# Patient Record
Sex: MALE | Race: OTHER | Employment: OTHER | URBAN - METROPOLITAN AREA
[De-identification: names, ages, dates, MRNs, and addresses within clinical notes are randomized per-mention and may not be internally consistent; named-entity substitution may affect disease eponyms.]

---

## 2017-01-04 ENCOUNTER — OFFICE VISIT (OUTPATIENT)
Dept: CARDIOLOGY CLINIC | Facility: CLINIC | Age: 67
End: 2017-01-04

## 2017-01-04 VITALS
WEIGHT: 183 LBS | DIASTOLIC BLOOD PRESSURE: 70 MMHG | HEART RATE: 92 BPM | RESPIRATION RATE: 18 BRPM | HEIGHT: 65 IN | BODY MASS INDEX: 30.49 KG/M2 | SYSTOLIC BLOOD PRESSURE: 160 MMHG

## 2017-01-04 DIAGNOSIS — E78.00 HYPERCHOLESTEREMIA: ICD-10-CM

## 2017-01-04 DIAGNOSIS — F17.200 SMOKER: ICD-10-CM

## 2017-01-04 DIAGNOSIS — R01.1 SYSTOLIC MURMUR: Primary | ICD-10-CM

## 2017-01-04 PROCEDURE — G0463 HOSPITAL OUTPT CLINIC VISIT: HCPCS | Performed by: INTERNAL MEDICINE

## 2017-01-04 PROCEDURE — 99204 OFFICE O/P NEW MOD 45 MIN: CPT | Performed by: INTERNAL MEDICINE

## 2017-01-04 NOTE — PATIENT INSTRUCTIONS
Continue current medications. Continue to monitor blood pressure at home. 2D echocardiogram with contrast.  Follow-up with me in 3-4 weeks or sooner if needed.

## 2017-01-04 NOTE — PROGRESS NOTES
Diane Goodman is a 74year old male. HPI:   Patient is here for a new patient appointment. He denies any cardiac history but does have a history of hyperlipidemia, tobacco abuse and significant alcohol abuse.   He also has history of seizure disorde 92  Resp 18  Ht 5' 5\" (1.651 m)  Wt 183 lb (83.008 kg)  BMI 30.45 kg/m2  GENERAL: well developed, well nourished,in no apparent distress  SKIN: no rashes,no suspicious lesions  HEENT: atraumatic, normocephalic,ears and throat are clear  NECK: supple,no ad

## 2017-01-06 ENCOUNTER — HOSPITAL ENCOUNTER (OUTPATIENT)
Dept: CARDIOLOGY CLINIC | Age: 67
End: 2017-01-06
Attending: INTERNAL MEDICINE
Payer: MEDICARE

## 2017-01-06 ENCOUNTER — HOSPITAL ENCOUNTER (OUTPATIENT)
Dept: CV DIAGNOSTICS | Facility: HOSPITAL | Age: 67
Discharge: HOME | End: 2017-01-06
Attending: INTERNAL MEDICINE
Payer: MEDICARE

## 2017-01-06 DIAGNOSIS — R01.1 SYSTOLIC MURMUR: ICD-10-CM

## 2017-01-06 PROCEDURE — 93306 TTE W/DOPPLER COMPLETE: CPT | Performed by: INTERNAL MEDICINE

## 2017-01-06 PROCEDURE — 93306 TTE W/DOPPLER COMPLETE: CPT

## 2017-01-09 ENCOUNTER — TELEPHONE (OUTPATIENT)
Dept: FAMILY MEDICINE CLINIC | Facility: CLINIC | Age: 67
End: 2017-01-09

## 2017-01-09 DIAGNOSIS — N40.0 ENLARGED PROSTATE: Primary | ICD-10-CM

## 2017-01-09 NOTE — TELEPHONE ENCOUNTER
Pt called back, info relayed, referral placed and contact information provided. Will call for appt, denies further questions at this time.

## 2017-01-09 NOTE — TELEPHONE ENCOUNTER
----- Message from Jojo Sams MD sent at 1/8/2017  3:17 PM CST -----  No bladder wall thickening seen on US. Prostate enlarged.  Should see urologist.

## 2017-01-13 ENCOUNTER — TELEPHONE (OUTPATIENT)
Dept: FAMILY MEDICINE CLINIC | Facility: CLINIC | Age: 67
End: 2017-01-13

## 2017-01-13 NOTE — TELEPHONE ENCOUNTER
States out of refills so our office must initiate new prescription for the patient's medications:  Needs both doses of carbamazepine, divalproex, and simvastatin.   Current Outpatient Prescriptions:  CARBAMAZEPINE 200 MG Oral Tab TAKE ONE TABLET BY MOUTH TW

## 2017-01-16 RX ORDER — CARBAMAZEPINE 100 MG/1
100 TABLET, CHEWABLE ORAL DAILY
Qty: 90 TABLET | Refills: 0 | Status: SHIPPED | OUTPATIENT
Start: 2017-01-16 | End: 2017-03-17

## 2017-01-16 RX ORDER — CARBAMAZEPINE 200 MG/1
TABLET ORAL
Qty: 180 TABLET | Refills: 0 | Status: SHIPPED | OUTPATIENT
Start: 2017-01-16 | End: 2017-03-23

## 2017-01-16 RX ORDER — DIVALPROEX SODIUM 500 MG/1
500 TABLET, DELAYED RELEASE ORAL 2 TIMES DAILY
Qty: 180 TABLET | Refills: 0 | Status: SHIPPED | OUTPATIENT
Start: 2017-01-16 | End: 2017-03-24

## 2017-01-16 RX ORDER — SIMVASTATIN 20 MG
20 TABLET ORAL NIGHTLY
Qty: 90 TABLET | Refills: 0 | Status: SHIPPED | OUTPATIENT
Start: 2017-01-16 | End: 2017-03-17

## 2017-01-16 NOTE — TELEPHONE ENCOUNTER
Rx request for Carbamazepine 100mg, Carbamazepine 200mg AND Divalproex Sodium 500mg PASSED Neurology Protocol. Rx filled per protocol.     Refill Protocol Appointment Criteria  · Appointment scheduled in the past 6 months or in the next 3 months  Recent Vis

## 2017-03-17 ENCOUNTER — TELEPHONE (OUTPATIENT)
Dept: INTERNAL MEDICINE CLINIC | Facility: CLINIC | Age: 67
End: 2017-03-17

## 2017-03-22 NOTE — TELEPHONE ENCOUNTER
Pt's wife is calling to follow up on request, states Chevy Senior has been calling pt constantly in regards refill. Please advise.

## 2017-03-23 RX ORDER — CARBAMAZEPINE 100 MG/1
TABLET, CHEWABLE ORAL
Qty: 90 TABLET | Refills: 0 | Status: SHIPPED | OUTPATIENT
Start: 2017-03-23 | End: 2017-05-30

## 2017-03-23 RX ORDER — CARBAMAZEPINE 100 MG/1
TABLET, CHEWABLE ORAL
Qty: 90 TABLET | Refills: 0 | Status: SHIPPED | OUTPATIENT
Start: 2017-03-23 | End: 2017-03-23

## 2017-03-23 RX ORDER — SIMVASTATIN 20 MG
TABLET ORAL
Qty: 90 TABLET | Refills: 0 | Status: SHIPPED | OUTPATIENT
Start: 2017-03-23 | End: 2017-05-30

## 2017-03-23 RX ORDER — CARBAMAZEPINE 200 MG/1
TABLET ORAL
Qty: 180 TABLET | Refills: 0 | Status: SHIPPED | OUTPATIENT
Start: 2017-03-23 | End: 2017-05-30

## 2017-03-23 NOTE — TELEPHONE ENCOUNTER
Refill Protocol Appointment Criteria  · Appointment scheduled in the past 12 months or in the next 3 months    Future Appointments       Provider Department Appt Notes    Tomorrow Geri Chi, [facility]

## 2017-03-23 NOTE — TELEPHONE ENCOUNTER
As per 01/13/17, pt is on two doses of carbemazepine. LOV 06/2016. Last RF 01/2017.   Please advise on refills request.

## 2017-03-24 ENCOUNTER — APPOINTMENT (OUTPATIENT)
Dept: LAB | Facility: HOSPITAL | Age: 67
End: 2017-03-24
Attending: UROLOGY
Payer: MEDICARE

## 2017-03-24 ENCOUNTER — OFFICE VISIT (OUTPATIENT)
Dept: SURGERY | Facility: CLINIC | Age: 67
End: 2017-03-24

## 2017-03-24 VITALS
BODY MASS INDEX: 30.49 KG/M2 | WEIGHT: 183 LBS | DIASTOLIC BLOOD PRESSURE: 82 MMHG | HEART RATE: 98 BPM | SYSTOLIC BLOOD PRESSURE: 157 MMHG | HEIGHT: 65 IN | TEMPERATURE: 98 F | RESPIRATION RATE: 18 BRPM

## 2017-03-24 DIAGNOSIS — N40.0 BENIGN NON-NODULAR PROSTATIC HYPERPLASIA, PRESENCE OF LOWER URINARY TRACT SYMPTOMS UNSPECIFIED: Primary | ICD-10-CM

## 2017-03-24 DIAGNOSIS — N40.0 BENIGN NON-NODULAR PROSTATIC HYPERPLASIA, PRESENCE OF LOWER URINARY TRACT SYMPTOMS UNSPECIFIED: ICD-10-CM

## 2017-03-24 LAB
BACTERIA UR QL AUTO: NEGATIVE /HPF
BILIRUB UR QL: NEGATIVE
CLARITY UR: CLEAR
COLOR UR: YELLOW
GLUCOSE UR-MCNC: NEGATIVE MG/DL
HGB UR QL STRIP.AUTO: NEGATIVE
LEUKOCYTE ESTERASE UR QL STRIP.AUTO: NEGATIVE
NITRITE UR QL STRIP.AUTO: NEGATIVE
PH UR: 6 [PH] (ref 5–8)
PROT UR-MCNC: NEGATIVE MG/DL
PSA SERPL-MCNC: 2.1 NG/ML (ref 0–4)
RBC #/AREA URNS AUTO: 4 /HPF
SP GR UR STRIP: 1.03 (ref 1–1.03)
UROBILINOGEN UR STRIP-ACNC: <2
VIT C UR-MCNC: 20 MG/DL
WBC #/AREA URNS AUTO: 0 /HPF

## 2017-03-24 PROCEDURE — 84153 ASSAY OF PSA TOTAL: CPT

## 2017-03-24 PROCEDURE — 99204 OFFICE O/P NEW MOD 45 MIN: CPT | Performed by: UROLOGY

## 2017-03-24 PROCEDURE — G0463 HOSPITAL OUTPT CLINIC VISIT: HCPCS | Performed by: UROLOGY

## 2017-03-24 PROCEDURE — 81003 URINALYSIS AUTO W/O SCOPE: CPT

## 2017-03-24 PROCEDURE — 36415 COLL VENOUS BLD VENIPUNCTURE: CPT

## 2017-03-24 NOTE — PROGRESS NOTES
SUBJECTIVE:  Jannie Caldwell is a 74year old male who presents for a consultation at the request of, and a copy of this note will be sent to, bladder mass noted on renal and bladder US done 12/2016, for evaluation of  ?  Renal mass seen on a CT without apparent distress. Alert and oriented times three, pleasant and cooperative.   CARDIOVASCULAR:normal apical impulse  RESPIRATORY: no chest wall deformities or tenderness  ABDOMEN: abdomen is soft without significant tenderness, masses, organomegaly or guar

## 2017-03-28 RX ORDER — DIVALPROEX SODIUM 500 MG/1
TABLET, DELAYED RELEASE ORAL
Qty: 180 TABLET | Refills: 0 | Status: SHIPPED | OUTPATIENT
Start: 2017-03-28 | End: 2017-05-30

## 2017-03-28 NOTE — TELEPHONE ENCOUNTER
please advise as does not meet RN protocol for refill criteria    Refill Protocol Appointment Criteria  · Appointment scheduled in the past 6 months or in the next 3 months    Future Appointments       Provider Department Appt Notes    In 1 week LALITHA COSBY

## 2017-04-10 ENCOUNTER — HOSPITAL ENCOUNTER (OUTPATIENT)
Dept: ULTRASOUND IMAGING | Age: 67
Discharge: HOME | End: 2017-04-10
Attending: UROLOGY
Payer: MEDICARE

## 2017-04-10 DIAGNOSIS — N40.0 BENIGN NON-NODULAR PROSTATIC HYPERPLASIA, PRESENCE OF LOWER URINARY TRACT SYMPTOMS UNSPECIFIED: ICD-10-CM

## 2017-04-10 PROCEDURE — 76857 US EXAM PELVIC LIMITED: CPT

## 2017-04-11 ENCOUNTER — TELEPHONE (OUTPATIENT)
Dept: SURGERY | Facility: CLINIC | Age: 67
End: 2017-04-11

## 2017-04-11 DIAGNOSIS — R31.29 MICROHEMATURIA: Primary | ICD-10-CM

## 2017-04-11 NOTE — TELEPHONE ENCOUNTER
I spoke with pt and he gave me verbal permission to speak with his spouse Mahamed Domínguez and I informed her of all the results in msg below and I gave her an appt for the Cysto on Friday, 5/12 at 10am and told her that the IVP needs to be schd.  And I gave the c

## 2017-04-11 NOTE — TELEPHONE ENCOUNTER
Spoke with pt's spouse again and informed her that I will cxl the 4/19 appt that pt had to discuss results and I also told her that I will make the referral for the office cysto that we schd.

## 2017-04-11 NOTE — TELEPHONE ENCOUNTER
Patients wife calling in regards to appt on 04/19 states appt was made for results. Wants to know if patient should still keep appt. Please advise.  Thank you

## 2017-04-11 NOTE — TELEPHONE ENCOUNTER
----- Message from Melody Kovacs MD sent at 4/3/2017 12:25 PM CDT -----  Staff please call this patient. Inform him his PSA level is normal.  His urinalysis however shows abnormally elevated blood cell levels in his urine.   He should be scheduled for an o

## 2017-05-22 PROBLEM — I70.0 ATHEROSCLEROSIS OF AORTA (HCC): Status: ACTIVE | Noted: 2017-05-22

## 2017-05-26 RX ORDER — CARBAMAZEPINE 100 MG/1
TABLET, CHEWABLE ORAL
Qty: 90 TABLET | Refills: 0 | Status: CANCELLED | OUTPATIENT
Start: 2017-05-26

## 2017-05-26 RX ORDER — SIMVASTATIN 20 MG
TABLET ORAL
Qty: 90 TABLET | Refills: 0 | Status: CANCELLED | OUTPATIENT
Start: 2017-05-26

## 2017-05-30 PROBLEM — N40.0 BENIGN NON-NODULAR PROSTATIC HYPERPLASIA WITHOUT LOWER URINARY TRACT SYMPTOMS: Status: ACTIVE | Noted: 2017-05-30

## 2017-05-30 PROBLEM — I10 ESSENTIAL HYPERTENSION: Status: ACTIVE | Noted: 2017-05-30

## 2017-05-30 NOTE — PATIENT INSTRUCTIONS
[place] SCREENING SCHEDULE   Tests on this list are recommended by your physician but may not be covered, or covered at this frequency, by your insurer. Please check with your insurance carrier before scheduling to verify coverage.     Karely Crocker often if abnormal Colonoscopy,10 Years due on 04/01/2017 Update Health Maintenance if applicable    Flex Sigmoidoscopy Screen  Covered every 5 years No results found for this or any previous visit. No flowsheet data found.      Fecal Occult Blood   Covered covered with your prescription benefits, but Medicare does not cover unless Medically needed    Zoster (Not covered by Medicare Part B) No orders found for this or any previous visit.  This may be covered with your pharmacy  prescription benefits     Recomm

## 2017-05-30 NOTE — PROGRESS NOTES
HPI:   Waylon Kwok is a 69year old male who presents for a Medicare Annual Wellness visit.     Patient Active Problem List:     Seizure disorder ([facility] [address]     Overweight     Hypercholesteremia     Elevated blood pressure     Smoker     Cardiomegaly difficulty walking or getting up?: 0-No    Do you have any tripping hazards?: 0-No    Are you on multiple medications?: 1-Yes          Have you had any memory issues?: 0-No    Fall/Risk Scorin    Scoring Interpretation: 0 - 3 No Risk     Depression Scr Family History   Problem Relation Age of Onset   • Cancer Mother      stomach   • Diabetes Mother    • Hypertension Mother    • Cancer Sister 44     skin   • Cancer Brother      thyroid   • Heart Disorder Neg       SOCIAL HISTORY:     Smoking Status: Cur Eye Chart Acuity: 20/40   NECK: supple, no adenopathy, no bruits  CHEST: no chest tenderness  BREAST: no masses or discharge  LUNGS: clear to auscultation  CARDIO: RRR without murmur  GI: good BS's, no masses, HSM or tenderness  : saw urology recently  R EKG One Time done    Colorectal Cancer Screening      Colonoscopy Screen every 10 years Colonoscopy,10 Years due on 04/01/2017 Update Health Maintenance if applicable    Flex Sigmoidoscopy Screen every 10 years No results found for this or any previous flowsheet data found. COPD      Spirometry Testing Annually No results found for this or any previous visit. No flowsheet data found.         SUGGESTED VACCINATIONS - Influenza, Pneumococcal, Zoster, Tetanus     Immunization History  Administered

## 2017-06-04 RX ORDER — DIVALPROEX SODIUM 500 MG/1
TABLET, DELAYED RELEASE ORAL
Qty: 180 TABLET | Refills: 0 | Status: SHIPPED | OUTPATIENT
Start: 2017-06-04 | End: 2018-08-22

## 2017-06-04 NOTE — TELEPHONE ENCOUNTER
Refilled per protocol.   Refill Protocol Appointment Criteria  · Appointment scheduled in the past 6 months or in the next 3 months  Recent Visits       Provider Department Primary Dx    5 days ago Gloria Nix, [address] [place] [address], [place] [address], [address] [facility] [address]

## 2017-06-05 ENCOUNTER — HOSPITAL ENCOUNTER (OUTPATIENT)
Dept: GENERAL RADIOLOGY | Facility: HOSPITAL | Age: 67
Discharge: HOME | End: 2017-06-05
Attending: UROLOGY
Payer: MEDICARE

## 2017-06-05 DIAGNOSIS — R31.29 MICROHEMATURIA: ICD-10-CM

## 2017-06-05 LAB — CREAT BLD-MCNC: 0.6 MG/DL (ref 0.5–1.5)

## 2017-06-05 PROCEDURE — 82565 ASSAY OF CREATININE: CPT

## 2017-06-05 PROCEDURE — 74400 UROGRAPHY IV +-KUB TOMOG: CPT

## 2017-06-16 ENCOUNTER — TELEPHONE (OUTPATIENT)
Dept: SURGERY | Facility: CLINIC | Age: 67
End: 2017-06-16

## 2017-06-19 NOTE — TELEPHONE ENCOUNTER
IVP shows no worrisome findings aside from mild prostate enlargement. He should followup as planned.   Thanks

## 2017-06-27 NOTE — TELEPHONE ENCOUNTER
Phoned pt's wife back and spoke with her. She confirms that she did not receive the result yet. Read to her 's reply as outlined below in this encounter, in it's entirety. She verbalized understanding , agrees to plan, and is thankful.  She is not ask

## 2017-06-28 ENCOUNTER — TELEPHONE (OUTPATIENT)
Dept: SURGERY | Facility: CLINIC | Age: 67
End: 2017-06-28

## 2017-06-28 DIAGNOSIS — N13.8 BPH WITH OBSTRUCTION/LOWER URINARY TRACT SYMPTOMS: Primary | ICD-10-CM

## 2017-06-28 DIAGNOSIS — N40.1 BPH WITH OBSTRUCTION/LOWER URINARY TRACT SYMPTOMS: Primary | ICD-10-CM

## 2017-07-31 ENCOUNTER — TELEPHONE (OUTPATIENT)
Dept: INTERNAL MEDICINE CLINIC | Facility: CLINIC | Age: 67
End: 2017-07-31

## 2017-07-31 ENCOUNTER — OFFICE VISIT (OUTPATIENT)
Dept: FAMILY MEDICINE CLINIC | Facility: CLINIC | Age: 67
End: 2017-07-31

## 2017-07-31 ENCOUNTER — TELEPHONE (OUTPATIENT)
Dept: FAMILY MEDICINE CLINIC | Facility: CLINIC | Age: 67
End: 2017-07-31

## 2017-07-31 VITALS
BODY MASS INDEX: 30 KG/M2 | HEART RATE: 88 BPM | DIASTOLIC BLOOD PRESSURE: 78 MMHG | SYSTOLIC BLOOD PRESSURE: 128 MMHG | TEMPERATURE: 99 F | WEIGHT: 182 LBS

## 2017-07-31 DIAGNOSIS — B95.8 STAPH SKIN INFECTION: Primary | ICD-10-CM

## 2017-07-31 DIAGNOSIS — L08.9 STAPH SKIN INFECTION: Primary | ICD-10-CM

## 2017-07-31 PROCEDURE — 99213 OFFICE O/P EST LOW 20 MIN: CPT | Performed by: FAMILY MEDICINE

## 2017-07-31 PROCEDURE — G0463 HOSPITAL OUTPT CLINIC VISIT: HCPCS | Performed by: FAMILY MEDICINE

## 2017-07-31 RX ORDER — MUPIROCIN CALCIUM 20 MG/G
1 CREAM TOPICAL 2 TIMES DAILY
Qty: 30 G | Refills: 1 | Status: SHIPPED | OUTPATIENT
Start: 2017-07-31 | End: 2017-08-10

## 2017-07-31 RX ORDER — CLARITHROMYCIN 500 MG/1
500 TABLET, COATED ORAL 2 TIMES DAILY
Qty: 20 TABLET | Refills: 0 | Status: SHIPPED | OUTPATIENT
Start: 2017-07-31 | End: 2017-08-10

## 2017-07-31 RX ORDER — MOMETASONE FUROATE 1 MG/G
CREAM TOPICAL
Qty: 30 G | Refills: 1 | Status: SHIPPED | OUTPATIENT
Start: 2017-07-31 | End: 2017-08-10

## 2017-07-31 RX ORDER — CLARITHROMYCIN 500 MG/1
500 TABLET, COATED ORAL 2 TIMES DAILY
Qty: 20 TABLET | Refills: 0 | Status: SHIPPED | OUTPATIENT
Start: 2017-07-31 | End: 2017-07-31

## 2017-07-31 NOTE — TELEPHONE ENCOUNTER
Actions Requested: appt given with Dr. Jax Montenegro. Problem: rash on right wrist  Onset and Timing: one week ago  Associated Symptoms: began as flat rash around right wrist.  Was itchy but now hand is swollen and rash is spreading into his hand.   Pt has trie

## 2017-07-31 NOTE — PROGRESS NOTES
7/31/2017  3:26 PM    Sarahy Tolbert is a 69year old male. Chief complaint(s): Patient presents with:  Rash: pt c/o rash on right wrist X 1 week    HPI:     Sarahy Tolbert primary complaint is regarding rash.      Sarahy Tolbert is a 69 y Application topically 2 (two) times daily.  Disp: 30 g Rfl: 1   Mometasone Furoate 0.1 % External Cream Apply to affected area(s) BID Disp: 30 g Rfl: 1   DIVALPROEX SODIUM 500 MG Oral Tab EC DR tab TAKE 1 TABLET TWICE DAILY Disp: 180 tablet Rfl: 0   simvast icterus. Neck: Neck supple. Cardiovascular: Normal rate and regular rhythm. Pulmonary/Chest:   Clear to ascultation bilaterally. Skin: No rash noted. Right wrist erythematous square    Psychiatric:   Appropriate affect and demeanor.        [facility] Referrals:  None         Lucas Fuller MD

## 2017-07-31 NOTE — TELEPHONE ENCOUNTER
Christophe's calling to advise Dr. John Duran that the medications below are interacting. Melissa Elizabeth states Dr. John Duran can switch the clarithromycin or stop the simvastatin while on the clarithromycin.       Current Outpatient Prescriptions:   •  clarithromycin (CARLOS

## 2017-08-01 NOTE — TELEPHONE ENCOUNTER
Please advise. Pharmacy notified of Dr Milo Aguirre message below. Pharmacy states that insurance will not allow override without letter from doctor saying patient will hold simvastatin.   Pharmacy did not have fax number to send this letter to, just provide

## 2017-08-01 NOTE — TELEPHONE ENCOUNTER
Please advise on the messages below. Medication interaction identified by pharmacy for biaxin and simvastatin. Do you want to change antibiotics or have pt stop simvastatin while on biaxin?

## 2017-08-03 ENCOUNTER — OFFICE VISIT (OUTPATIENT)
Dept: FAMILY MEDICINE CLINIC | Facility: CLINIC | Age: 67
End: 2017-08-03

## 2017-08-03 VITALS
TEMPERATURE: 98 F | SYSTOLIC BLOOD PRESSURE: 166 MMHG | WEIGHT: 183 LBS | HEART RATE: 92 BPM | DIASTOLIC BLOOD PRESSURE: 88 MMHG | BODY MASS INDEX: 30 KG/M2

## 2017-08-03 DIAGNOSIS — F17.200 NICOTINE USE DISORDER: ICD-10-CM

## 2017-08-03 DIAGNOSIS — B95.8 STAPH SKIN INFECTION: Primary | ICD-10-CM

## 2017-08-03 DIAGNOSIS — L08.9 STAPH SKIN INFECTION: Primary | ICD-10-CM

## 2017-08-03 PROCEDURE — G0463 HOSPITAL OUTPT CLINIC VISIT: HCPCS | Performed by: FAMILY MEDICINE

## 2017-08-03 PROCEDURE — 99214 OFFICE O/P EST MOD 30 MIN: CPT | Performed by: FAMILY MEDICINE

## 2017-08-03 NOTE — PROGRESS NOTES
8/3/2017  2:23 PM    Alexander Farrell is a 69year old male. Chief complaint(s): Patient presents with: Follow - Up  Rash    HPI:     Alexander Farrell primary complaint is regarding rash.      Alexander Farrell is a 69year old male who presents topically 2 (two) times daily. Disp: 30 g Rfl: 1   Mometasone Furoate 0.1 % External Cream Apply to affected area(s) BID Disp: 30 g Rfl: 1   clarithromycin (BIAXIN) 500 MG Oral Tab Take 1 tablet (500 mg total) by mouth 2 (two) times daily.  Disp: 20 tablet Conjunctivae are normal. No scleral icterus. Neck: Neck supple. Cardiovascular: Normal rate and regular rhythm. Pulmonary/Chest:   Clear to ascultation bilaterally. Skin: No rash noted.    Right wrist erythema minimal    Psychiatric:   Appropriate call our office with any questions or concerns. Notify Dr Korey Hills or the [facility] if there is a deterioration or worsening of the medical condition. Also, inform the doctor with any new symptoms or medications' side effects.   CPM    FOLLOW-UP: Sche

## 2017-08-10 ENCOUNTER — OFFICE VISIT (OUTPATIENT)
Dept: SURGERY | Facility: CLINIC | Age: 67
End: 2017-08-10

## 2017-08-10 VITALS
HEIGHT: 65 IN | SYSTOLIC BLOOD PRESSURE: 140 MMHG | WEIGHT: 180 LBS | TEMPERATURE: 98 F | RESPIRATION RATE: 18 BRPM | BODY MASS INDEX: 29.99 KG/M2 | DIASTOLIC BLOOD PRESSURE: 82 MMHG | HEART RATE: 90 BPM

## 2017-08-10 DIAGNOSIS — N40.1 BPH WITH OBSTRUCTION/LOWER URINARY TRACT SYMPTOMS: ICD-10-CM

## 2017-08-10 DIAGNOSIS — N13.8 BPH WITH OBSTRUCTION/LOWER URINARY TRACT SYMPTOMS: ICD-10-CM

## 2017-08-10 DIAGNOSIS — R31.29 MICROHEMATURIA: Primary | ICD-10-CM

## 2017-08-10 PROCEDURE — 99213 OFFICE O/P EST LOW 20 MIN: CPT | Performed by: UROLOGY

## 2017-08-10 PROCEDURE — 52000 CYSTOURETHROSCOPY: CPT | Performed by: UROLOGY

## 2017-08-10 NOTE — PROGRESS NOTES
Jannie Caldwell is a 69year old male. HPI:   Patient presents with:  Hematuria: cystoscopy  18-year-old male presents for office cystoscopy  Cystoscopy.   He was seen March 24, 2017 in consultation because of an incidental bladder mass identified on DAY Disp: 180 tablet Rfl: 3   Calcium Carb-Cholecalciferol (OYSTER SHELL CALCIUM/VITAMIN D) 250-125 MG-UNIT Oral Tab Take 1 tablet by mouth daily. Disp:  Rfl:    aspirin 81 MG Oral Tab EC Take 1 tablet (81 mg total) by mouth daily.  Disp: 90 tablet Rfl: 1

## 2017-10-03 ENCOUNTER — OFFICE VISIT (OUTPATIENT)
Dept: GASTROENTEROLOGY | Facility: CLINIC | Age: 67
End: 2017-10-03

## 2017-10-03 ENCOUNTER — TELEPHONE (OUTPATIENT)
Dept: GASTROENTEROLOGY | Facility: CLINIC | Age: 67
End: 2017-10-03

## 2017-10-03 VITALS
DIASTOLIC BLOOD PRESSURE: 68 MMHG | HEART RATE: 81 BPM | BODY MASS INDEX: 29.82 KG/M2 | SYSTOLIC BLOOD PRESSURE: 122 MMHG | WEIGHT: 179 LBS | HEIGHT: 65 IN

## 2017-10-03 DIAGNOSIS — Z12.11 ENCOUNTER FOR COLORECTAL CANCER SCREENING: Primary | ICD-10-CM

## 2017-10-03 DIAGNOSIS — Z72.0 TOBACCO USE: ICD-10-CM

## 2017-10-03 DIAGNOSIS — Z12.11 COLON CANCER SCREENING: Primary | ICD-10-CM

## 2017-10-03 DIAGNOSIS — Z12.12 ENCOUNTER FOR COLORECTAL CANCER SCREENING: Primary | ICD-10-CM

## 2017-10-03 DIAGNOSIS — I10 ESSENTIAL HYPERTENSION: ICD-10-CM

## 2017-10-03 PROCEDURE — 99202 OFFICE O/P NEW SF 15 MIN: CPT | Performed by: INTERNAL MEDICINE

## 2017-10-03 PROCEDURE — G0463 HOSPITAL OUTPT CLINIC VISIT: HCPCS | Performed by: INTERNAL MEDICINE

## 2017-10-03 RX ORDER — BETA-CAROTENE(A)-C,E/SELENIUM
CAPSULE ORAL
COMMUNITY
Start: 2017-07-10 | End: 2020-08-04

## 2017-10-03 RX ORDER — CLOTRIMAZOLE 1 %
CREAM (GRAM) TOPICAL
COMMUNITY
Start: 2017-07-10 | End: 2018-03-13

## 2017-10-03 NOTE — PROGRESS NOTES
HPI:    Patient ID: Ovi Cortes is a 69year old male. HPI    Review of Systems   Constitutional: Negative for appetite change, chills, diaphoresis, fatigue, fever and unexpected weight change.    HENT: Negative for congestion, ear pain and noseb well-developed and well-nourished. No distress. HENT:   Head: Normocephalic and atraumatic. Mouth/Throat: Oropharynx is clear and moist. No oropharyngeal exudate. Eyes: Conjunctivae and EOM are normal. Pupils are equal, round, and reactive to light.

## 2017-10-03 NOTE — PATIENT INSTRUCTIONS
1.  Schedule colonoscopy with split dose MiraLAX preparation and MAC    2. Quit smoking on the day of the procedure.     3.  Take your blood pressure at home twice daily and show findings to your primary care provider

## 2017-10-03 NOTE — H&P
History of present illness: This is a 66-year-old male patient of Dr. Jackelin Harden who is referred for colorectal cancer screening evaluation.   The patient is here with his wife who is translating at the patient request.  The patient has undergone colonoscopy indicated.

## 2017-10-03 NOTE — TELEPHONE ENCOUNTER
Scheduled for:  Colonoscopy 07439  Provider Name: Dr Gaston Godinez  Date:  Leo Lew 11/30/17  Location:  [facility]  Sedation:  MAC  Time:  8:30 am  Prep: split dose miralax  Meds/Allergies Reconciled?:  NKDA  Diagnosis with codes:  Screening Z12.11  Was patient informed to c

## 2017-10-04 NOTE — TELEPHONE ENCOUNTER
I called and left a voicemail message for pt to return my call regarding moving his scheduled colonoscopy procedure from 8:30 am to 10:30 am or 11:00 am @ [address] [place] [address] on Thurs 11/30/17, same location and date just different time due to Alessandro 27 unavailability.     Shakira Reach

## 2017-10-05 NOTE — TELEPHONE ENCOUNTER
I Spoke to Mrs Terry Johnson and she stated she had already spoken to someone in our office this morning. As per our conversation, pt's wife agreed to a 10:30 colonoscopy procedure with an arrival time of t 9:30 am @ [facility].   I sent a message to Carolynn and made the

## 2017-11-30 ENCOUNTER — ANESTHESIA (OUTPATIENT)
Dept: ENDOSCOPY | Facility: HOSPITAL | Age: 67
End: 2017-11-30
Payer: MEDICARE

## 2017-11-30 ENCOUNTER — SURGERY (OUTPATIENT)
Age: 67
End: 2017-11-30

## 2017-11-30 ENCOUNTER — HOSPITAL ENCOUNTER (OUTPATIENT)
Facility: HOSPITAL | Age: 67
Setting detail: HOSPITAL OUTPATIENT SURGERY
Discharge: HOME | End: 2017-11-30
Attending: INTERNAL MEDICINE | Admitting: INTERNAL MEDICINE
Payer: MEDICARE

## 2017-11-30 ENCOUNTER — ANESTHESIA EVENT (OUTPATIENT)
Dept: ENDOSCOPY | Facility: HOSPITAL | Age: 67
End: 2017-11-30
Payer: MEDICARE

## 2017-11-30 VITALS
WEIGHT: 172 LBS | OXYGEN SATURATION: 98 % | DIASTOLIC BLOOD PRESSURE: 76 MMHG | SYSTOLIC BLOOD PRESSURE: 133 MMHG | HEIGHT: 62 IN | RESPIRATION RATE: 22 BRPM | HEART RATE: 73 BPM | BODY MASS INDEX: 31.65 KG/M2

## 2017-11-30 DIAGNOSIS — K63.5 POLYP OF TRANSVERSE COLON, UNSPECIFIED TYPE: ICD-10-CM

## 2017-11-30 DIAGNOSIS — Z12.11 COLON CANCER SCREENING: ICD-10-CM

## 2017-11-30 DIAGNOSIS — K63.5 POLYP OF ASCENDING COLON, UNSPECIFIED TYPE: Primary | ICD-10-CM

## 2017-11-30 DIAGNOSIS — K64.9 HEMORRHOIDS: ICD-10-CM

## 2017-11-30 PROBLEM — K64.8 INTERNAL HEMORRHOIDS: Status: ACTIVE | Noted: 2017-11-30

## 2017-11-30 PROBLEM — Z98.890 S/P COLONOSCOPIC POLYPECTOMY: Status: ACTIVE | Noted: 2017-11-30

## 2017-11-30 PROCEDURE — 0DBL8ZX EXCISION OF TRANSVERSE COLON, VIA NATURAL OR ARTIFICIAL OPENING ENDOSCOPIC, DIAGNOSTIC: ICD-10-PCS | Performed by: INTERNAL MEDICINE

## 2017-11-30 PROCEDURE — 45385 COLONOSCOPY W/LESION REMOVAL: CPT | Performed by: INTERNAL MEDICINE

## 2017-11-30 PROCEDURE — 0DBK8ZX EXCISION OF ASCENDING COLON, VIA NATURAL OR ARTIFICIAL OPENING ENDOSCOPIC, DIAGNOSTIC: ICD-10-PCS | Performed by: INTERNAL MEDICINE

## 2017-11-30 RX ORDER — SODIUM CHLORIDE, SODIUM LACTATE, POTASSIUM CHLORIDE, CALCIUM CHLORIDE 600; 310; 30; 20 MG/100ML; MG/100ML; MG/100ML; MG/100ML
INJECTION, SOLUTION INTRAVENOUS CONTINUOUS
Status: DISCONTINUED | OUTPATIENT
Start: 2017-11-30 | End: 2017-11-30

## 2017-11-30 RX ORDER — LIDOCAINE HYDROCHLORIDE 10 MG/ML
INJECTION, SOLUTION EPIDURAL; INFILTRATION; INTRACAUDAL; PERINEURAL AS NEEDED
Status: DISCONTINUED | OUTPATIENT
Start: 2017-11-30 | End: 2017-11-30 | Stop reason: SURG

## 2017-11-30 RX ORDER — NALOXONE HYDROCHLORIDE 0.4 MG/ML
80 INJECTION, SOLUTION INTRAMUSCULAR; INTRAVENOUS; SUBCUTANEOUS AS NEEDED
Status: DISCONTINUED | OUTPATIENT
Start: 2017-11-30 | End: 2017-11-30

## 2017-11-30 RX ADMIN — SODIUM CHLORIDE, SODIUM LACTATE, POTASSIUM CHLORIDE, CALCIUM CHLORIDE: 600; 310; 30; 20 INJECTION, SOLUTION INTRAVENOUS at 11:24:00

## 2017-11-30 RX ADMIN — LIDOCAINE HYDROCHLORIDE 50 MG: 10 INJECTION, SOLUTION EPIDURAL; INFILTRATION; INTRACAUDAL; PERINEURAL at 11:28:00

## 2017-11-30 NOTE — ANESTHESIA POSTPROCEDURE EVALUATION
Patient: Marco Cruz    Procedure Summary     Date:  11/30/17 Room / Location:  [facility] ENDOSCOPY 05 / [facility] ENDOSCOPY    Anesthesia Start:  6619 Anesthesia Stop:  5094    Procedure:  COLONOSCOPY (N/A ) Diagnosis:       Colon cancer screening      (polyp

## 2017-11-30 NOTE — ANESTHESIA PREPROCEDURE EVALUATION
Anesthesia PreOp Note    HPI:     Alexander Farrell is a 69year old male who presents for preoperative consultation requested by: Rosalio Morales MD    Date of Surgery: 11/30/2017    Procedure(s):  COLONOSCOPY  Indication: Colon cancer scree carBAMazepine 200 MG Oral Tab TAKE ONE TABLET BY MOUTH TWICE A DAY Disp: 180 tablet Rfl: 3 11/29/2017   Calcium Carb-Cholecalciferol (OYSTER SHELL CALCIUM/VITAMIN D) 250-125 MG-UNIT Oral Tab Take 1 tablet by mouth daily.    Disp:  Rfl:  Taking   aspirin 8 2  Plan:   MAC  Post-op Pain Management: IV analgesics  Informed Consent Plan and Risks Discussed With:  Patient  Discussed plan with:  CRNA      I have informed Adri Weems  of the nature of the anesthetic plan, benefits, risks, major complication

## 2017-11-30 NOTE — H&P
History & Physical Examination    Patient Name: Marco Cruz  MRN: [id number]  [facility]: [id number]  YOB: 1950    Diagnosis: colorectal screening      Prescriptions Prior to Admission:  Calcium Citrate-Vitamin D 1000-400 Oral Liquid  Disp: not normal, please explain:   HEBETO [ Marco Meredith  [ Mina Mention [ Osiel Salon [ Eddie [place] [ Bud Guzman [ Geryl [place]      I have discussed the risks and benefits and alternatives of the procedure with the patient/family.   They understand and agree to pro

## 2017-11-30 NOTE — BRIEF OP NOTE
Pre-Operative Diagnosis: Colon cancer screening      Post-Operative Diagnosis: Suboptimal preparation, status post polypectomy ×2, internal hemorrhoids     Procedure Performed:   Procedure(s):  COLONOSCOPY with polypectomy ×2    Surgeon(s) and Role:

## 2017-11-30 NOTE — OPERATIVE REPORT
[facility]    PATIENT'S NAME: Marquisfany Radha   ATTENDING PHYSICIAN: Merline Johns MD   OPERATING PHYSICIAN: Merline Johns MD   PATIENT ACCOUNT#:   [id number]    LOCATION:  [facility] ROOM 2 [address] small lesions cannot be entirely excluded because of suboptimal prep. Retroflexion in the rectum showed small internal hemorrhoids. The patient tolerated the procedure well without immediate complication. IMPRESSION:    1.    Status post polypectomy x2

## 2017-12-02 ENCOUNTER — TELEPHONE (OUTPATIENT)
Dept: GASTROENTEROLOGY | Facility: CLINIC | Age: 67
End: 2017-12-02

## 2017-12-04 NOTE — TELEPHONE ENCOUNTER
Entered into EPIC:Recall colon in 1 year per Dr. Otilia Castro. Last  Colon done 11/30/2017, next due 11/30/2018. Snapshot updated.

## 2018-01-08 ENCOUNTER — TELEPHONE (OUTPATIENT)
Dept: FAMILY MEDICINE CLINIC | Facility: CLINIC | Age: 68
End: 2018-01-08

## 2018-01-08 NOTE — TELEPHONE ENCOUNTER
Pt is eligible for Medicare Annual Health Assessment in May. If patient returns call please  assist in scheduling. Left message to call back [phone].

## 2018-02-02 ENCOUNTER — TELEPHONE (OUTPATIENT)
Dept: FAMILY MEDICINE CLINIC | Facility: CLINIC | Age: 68
End: 2018-02-02

## 2018-02-13 ENCOUNTER — OFFICE VISIT (OUTPATIENT)
Dept: FAMILY MEDICINE CLINIC | Facility: CLINIC | Age: 68
End: 2018-02-13

## 2018-02-13 VITALS
SYSTOLIC BLOOD PRESSURE: 138 MMHG | BODY MASS INDEX: 30.05 KG/M2 | HEIGHT: 65 IN | HEART RATE: 86 BPM | WEIGHT: 180.38 LBS | DIASTOLIC BLOOD PRESSURE: 79 MMHG | TEMPERATURE: 99 F

## 2018-02-13 DIAGNOSIS — Z11.59 NEED FOR HEPATITIS C SCREENING TEST: ICD-10-CM

## 2018-02-13 DIAGNOSIS — J43.9 PULMONARY EMPHYSEMA, UNSPECIFIED EMPHYSEMA TYPE (HCC): ICD-10-CM

## 2018-02-13 DIAGNOSIS — N40.0 BENIGN NON-NODULAR PROSTATIC HYPERPLASIA WITHOUT LOWER URINARY TRACT SYMPTOMS: ICD-10-CM

## 2018-02-13 DIAGNOSIS — Z00.00 ENCOUNTER FOR ANNUAL HEALTH EXAMINATION: ICD-10-CM

## 2018-02-13 DIAGNOSIS — Z00.00 MEDICARE ANNUAL WELLNESS VISIT, SUBSEQUENT: Primary | ICD-10-CM

## 2018-02-13 DIAGNOSIS — I70.0 ATHEROSCLEROSIS OF AORTA (HCC): ICD-10-CM

## 2018-02-13 DIAGNOSIS — K64.8 INTERNAL HEMORRHOIDS: ICD-10-CM

## 2018-02-13 DIAGNOSIS — Z98.890 S/P COLONOSCOPIC POLYPECTOMY: ICD-10-CM

## 2018-02-13 DIAGNOSIS — G40.909 SEIZURE DISORDER (HCC): ICD-10-CM

## 2018-02-13 DIAGNOSIS — F17.200 SMOKER: ICD-10-CM

## 2018-02-13 DIAGNOSIS — D12.6 TUBULAR ADENOMA OF COLON: ICD-10-CM

## 2018-02-13 DIAGNOSIS — R31.29 MICROHEMATURIA: ICD-10-CM

## 2018-02-13 DIAGNOSIS — E78.00 HYPERCHOLESTEREMIA: ICD-10-CM

## 2018-02-13 DIAGNOSIS — H54.7 DECREASED VISUAL ACUITY: ICD-10-CM

## 2018-02-13 DIAGNOSIS — M25.471 RIGHT ANKLE SWELLING: ICD-10-CM

## 2018-02-13 DIAGNOSIS — I10 ESSENTIAL HYPERTENSION: ICD-10-CM

## 2018-02-13 PROBLEM — Z72.0 TOBACCO USE: Status: RESOLVED | Noted: 2017-10-03 | Resolved: 2018-02-13

## 2018-02-13 PROCEDURE — G0439 PPPS, SUBSEQ VISIT: HCPCS | Performed by: FAMILY MEDICINE

## 2018-02-13 PROCEDURE — 96160 PT-FOCUSED HLTH RISK ASSMT: CPT | Performed by: FAMILY MEDICINE

## 2018-02-13 RX ORDER — HYDROCHLOROTHIAZIDE 25 MG/1
25 TABLET ORAL EVERY MORNING
Qty: 90 TABLET | Refills: 0 | Status: SHIPPED | OUTPATIENT
Start: 2018-02-13 | End: 2018-03-13

## 2018-02-13 NOTE — PROGRESS NOTES
HPI:   José Miguel Braun is a 69year old male who presents for a Medicare Subsequent Annual Wellness visit (Pt already had Initial Annual Wellness).       Annual Physical due on 05/30/2018        Fall/Risk Assessment Update needed    Last Fall risk scr Packs/day: 0.25      Years: 40.00        Types: Cigarettes  Smokeless tobacco: Never Used                      Comment: 4-5 cig / daily      This is a tobacco user, and I will give tobacco cessation counseling today.  (update Vitals or Tob Hx sectio DIVALPROEX SODIUM 500 MG Oral Tab EC DR tab TAKE 1 TABLET TWICE DAILY   simvastatin 20 MG Oral Tab TAKE 1 TABLET EVERY NIGHT   carBAMazepine 100 MG Oral Chew Tab CHEW AND SWALLOW 1 TABLET EVERY DAY   carBAMazepine 200 MG Oral Tab TAKE ONE TABLET BY MOUTH conrad:  No I have trouble hearing conversations in a noisy background such as a crowded room or restaurant:  No   I get confused about where sounds come from:  No I misunderstand some words in a sentence and need to ask people to repeat themselves:   No masses, no organomegaly   Genitalia: Normal male   Rectal: Normal tone, normal prostate, no masses or tenderness   Extremities: Extremities normal, atraumatic, no cyanosis or edema   Pulses: 2+ and symmetric   Skin: Skin color, texture, turgor normal, no r PLAN:  The patient indicates understanding of these issues and agrees to the plan. Reinforced healthy diet, lifestyle, and exercise. Patient counseled to quit smoking. Smoking cessation options reviewed.   Risks associated with smoking including canc Hepatitis B for Moderate/High Risk No vaccine history found Medium/high risk factors:   End-stage renal disease   Hemophiliacs who received Factor VIII or IX concentrates   Clients of institutions for the mentally retarded   Persons who live in the same [place]

## 2018-02-13 NOTE — PATIENT INSTRUCTIONS
Kendrick [place] SCREENING SCHEDULE   Tests on this list are recommended by your physician but may not be covered, or covered at this frequency, by your insurer. Please check with your insurance carrier before scheduling to verify coverage.     Avani Sales often if abnormal Colonoscopy,1 Year due on 11/30/2018 Update Health Maintenance if applicable    Flex Sigmoidoscopy Screen  Covered every 5 years No results found for this or any previous visit. No flowsheet data found.      Fecal Occult Blood   Covered An covered with your prescription benefits, but Medicare does not cover unless Medically needed    Zoster (Not covered by Medicare Part B) No orders found for this or any previous visit.  This may be covered with your pharmacy  prescription benefits     Recomm

## 2018-02-27 ENCOUNTER — LAB ENCOUNTER (OUTPATIENT)
Dept: LAB | Age: 68
End: 2018-02-27
Attending: FAMILY MEDICINE
Payer: MEDICARE

## 2018-02-27 DIAGNOSIS — G40.909 SEIZURE DISORDER (HCC): ICD-10-CM

## 2018-02-27 DIAGNOSIS — Z11.59 NEED FOR HEPATITIS C SCREENING TEST: ICD-10-CM

## 2018-02-27 DIAGNOSIS — E78.00 HYPERCHOLESTEREMIA: ICD-10-CM

## 2018-02-27 LAB
ALBUMIN SERPL BCP-MCNC: 3.9 G/DL (ref 3.5–4.8)
ALBUMIN/GLOB SERPL: 1.1 {RATIO} (ref 1–2)
ALP SERPL-CCNC: 65 U/L (ref 32–100)
ALT SERPL-CCNC: 36 U/L (ref 17–63)
ANION GAP SERPL CALC-SCNC: 8 MMOL/L (ref 0–18)
AST SERPL-CCNC: 29 U/L (ref 15–41)
BASOPHILS # BLD: 0.1 K/UL (ref 0–0.2)
BASOPHILS NFR BLD: 1 %
BILIRUB SERPL-MCNC: 0.5 MG/DL (ref 0.3–1.2)
BUN SERPL-MCNC: 13 MG/DL (ref 8–20)
BUN/CREAT SERPL: 20 (ref 10–20)
CALCIUM SERPL-MCNC: 9.2 MG/DL (ref 8.5–10.5)
CHLORIDE SERPL-SCNC: 98 MMOL/L (ref 95–110)
CHOLEST SERPL-MCNC: 201 MG/DL (ref 110–200)
CO2 SERPL-SCNC: 27 MMOL/L (ref 22–32)
CREAT SERPL-MCNC: 0.65 MG/DL (ref 0.5–1.5)
EOSINOPHIL # BLD: 0.4 K/UL (ref 0–0.7)
EOSINOPHIL NFR BLD: 5 %
ERYTHROCYTE [DISTWIDTH] IN BLOOD BY AUTOMATED COUNT: 13.8 % (ref 11–15)
GLOBULIN PLAS-MCNC: 3.6 G/DL (ref 2.5–3.7)
GLUCOSE SERPL-MCNC: 104 MG/DL (ref 70–99)
HCT VFR BLD AUTO: 43 % (ref 41–52)
HDLC SERPL-MCNC: 68 MG/DL
HGB BLD-MCNC: 14.7 G/DL (ref 13.5–17.5)
LDLC SERPL CALC-MCNC: 112 MG/DL (ref 0–99)
LYMPHOCYTES # BLD: 3.1 K/UL (ref 1–4)
LYMPHOCYTES NFR BLD: 40 %
MCH RBC QN AUTO: 32.4 PG (ref 27–32)
MCHC RBC AUTO-ENTMCNC: 34.3 G/DL (ref 32–37)
MCV RBC AUTO: 94.4 FL (ref 80–100)
MONOCYTES # BLD: 0.8 K/UL (ref 0–1)
MONOCYTES NFR BLD: 10 %
NEUTROPHILS # BLD AUTO: 3.5 K/UL (ref 1.8–7.7)
NEUTROPHILS NFR BLD: 44 %
NONHDLC SERPL-MCNC: 133 MG/DL
OSMOLALITY UR CALC.SUM OF ELEC: 276 MOSM/KG (ref 275–295)
PATIENT FASTING: YES
PLATELET # BLD AUTO: 200 K/UL (ref 140–400)
PMV BLD AUTO: 8.4 FL (ref 7.4–10.3)
POTASSIUM SERPL-SCNC: 3.8 MMOL/L (ref 3.3–5.1)
PROT SERPL-MCNC: 7.5 G/DL (ref 5.9–8.4)
RBC # BLD AUTO: 4.55 M/UL (ref 4.5–5.9)
SODIUM SERPL-SCNC: 133 MMOL/L (ref 136–144)
TRIGL SERPL-MCNC: 104 MG/DL (ref 1–149)
WBC # BLD AUTO: 7.8 K/UL (ref 4–11)

## 2018-02-27 PROCEDURE — 85025 COMPLETE CBC W/AUTO DIFF WBC: CPT

## 2018-02-27 PROCEDURE — 86803 HEPATITIS C AB TEST: CPT

## 2018-02-27 PROCEDURE — 80061 LIPID PANEL: CPT

## 2018-02-27 PROCEDURE — 80053 COMPREHEN METABOLIC PANEL: CPT

## 2018-02-27 PROCEDURE — 36415 COLL VENOUS BLD VENIPUNCTURE: CPT

## 2018-02-28 LAB — HCV AB SERPL QL IA: NONREACTIVE

## 2018-03-06 ENCOUNTER — TELEPHONE (OUTPATIENT)
Dept: FAMILY MEDICINE CLINIC | Facility: CLINIC | Age: 68
End: 2018-03-06

## 2018-03-06 NOTE — TELEPHONE ENCOUNTER
----- Message from aCrlyn Farley MD sent at 3/5/2018  8:53 PM CST -----  Labs all good including cholesterol stable. Blood counts liver and kidney function good. Hepatitis C is negative as per required by Medicare.   Please continue current medications and

## 2018-03-13 ENCOUNTER — OFFICE VISIT (OUTPATIENT)
Dept: FAMILY MEDICINE CLINIC | Facility: CLINIC | Age: 68
End: 2018-03-13

## 2018-03-13 VITALS
SYSTOLIC BLOOD PRESSURE: 123 MMHG | HEART RATE: 93 BPM | HEIGHT: 65 IN | BODY MASS INDEX: 30.01 KG/M2 | TEMPERATURE: 99 F | DIASTOLIC BLOOD PRESSURE: 81 MMHG | WEIGHT: 180.13 LBS

## 2018-03-13 DIAGNOSIS — I10 ESSENTIAL HYPERTENSION: Primary | ICD-10-CM

## 2018-03-13 DIAGNOSIS — G40.909 SEIZURE DISORDER (HCC): ICD-10-CM

## 2018-03-13 PROCEDURE — G0463 HOSPITAL OUTPT CLINIC VISIT: HCPCS | Performed by: FAMILY MEDICINE

## 2018-03-13 PROCEDURE — 99213 OFFICE O/P EST LOW 20 MIN: CPT | Performed by: FAMILY MEDICINE

## 2018-03-13 RX ORDER — HYDROCHLOROTHIAZIDE 25 MG/1
25 TABLET ORAL EVERY MORNING
Qty: 90 TABLET | Refills: 3 | Status: SHIPPED | OUTPATIENT
Start: 2018-03-13 | End: 2019-02-14

## 2018-03-13 NOTE — PROGRESS NOTES
Shwetha Philip is a 69year old male. HPI:   Patient presents for recheck of his hypertension. Pt has been taking medications as instructed, no medication side effects.     Wt Readings from Last 6 Encounters:  03/13/18 : 180 lb 2 oz (81.7 kg)  02/1 Diagnosis Date   • Hyperlipidemia    • Internal hemorrhoids 11/30/2017   • Pulmonary emphysema ([facility] [address]    • S/P colonoscopic polypectomy 11/30/2017   • Seizure disorder ([facility]     2014   • Seizures [facility])       Past Surgical History:  2007: COLONOSCOPY per wife.

## 2018-03-14 ENCOUNTER — TELEPHONE (OUTPATIENT)
Dept: NEUROLOGY | Facility: CLINIC | Age: 68
End: 2018-03-14

## 2018-03-30 ENCOUNTER — OFFICE VISIT (OUTPATIENT)
Dept: OPTOMETRY | Facility: CLINIC | Age: 68
End: 2018-03-30

## 2018-03-30 DIAGNOSIS — H25.13 AGE-RELATED NUCLEAR CATARACT OF BOTH EYES: Primary | ICD-10-CM

## 2018-03-30 DIAGNOSIS — H52.4 HYPEROPIA WITH PRESBYOPIA, BILATERAL: ICD-10-CM

## 2018-03-30 DIAGNOSIS — H52.03 HYPEROPIA WITH PRESBYOPIA, BILATERAL: ICD-10-CM

## 2018-03-30 PROCEDURE — 92015 DETERMINE REFRACTIVE STATE: CPT | Performed by: OPTOMETRIST

## 2018-03-30 PROCEDURE — 92004 COMPRE OPH EXAM NEW PT 1/>: CPT | Performed by: OPTOMETRIST

## 2018-03-30 NOTE — PATIENT INSTRUCTIONS
Age-related nuclear cataract of both eyes  No treatment is required. Will continue to observe. Hyperopia with presbyopia, bilateral  New glasses RX given to update as needed.

## 2018-03-30 NOTE — PROGRESS NOTES
Denise Lainez is a 69year old male. HPI:     HPI     Patient is in for an annual eye exam. His last EE was 3 years ago in [facility]. Has a difficult time reading with his glasses. No complaints at distance.      Last edited by Deyvi Garcia, THAIS on 3/30/2 A DAY Disp: 180 tablet Rfl: 3   aspirin 81 MG Oral Tab EC Take 1 tablet (81 mg total) by mouth daily.  Disp: 90 tablet Rfl: 1       Allergies:  No Known Allergies    ROS:     ROS     Negative for: Constitutional, Gastrointestinal, Neurological, Skin, Genito Type:  Round top bifocal          Manifest Refraction (Auto)       Sphere Cylinder Axis Dist VA Add    Right +3.00 -1.00 140      Left +2.25 -0.75 075            Manifest Refraction #2       Sphere Cylinder Axis Dist VA Add    Right +1.50 Sphere  20/30 +2.

## 2018-04-03 ENCOUNTER — OFFICE VISIT (OUTPATIENT)
Dept: NEUROLOGY | Facility: CLINIC | Age: 68
End: 2018-04-03

## 2018-04-03 VITALS
SYSTOLIC BLOOD PRESSURE: 132 MMHG | WEIGHT: 180 LBS | HEART RATE: 109 BPM | DIASTOLIC BLOOD PRESSURE: 60 MMHG | HEIGHT: 65 IN | RESPIRATION RATE: 16 BRPM | BODY MASS INDEX: 29.99 KG/M2

## 2018-04-03 DIAGNOSIS — G40.309 NONINTRACTABLE GENERALIZED IDIOPATHIC EPILEPSY WITHOUT STATUS EPILEPTICUS (HCC): Primary | ICD-10-CM

## 2018-04-03 PROCEDURE — 99203 OFFICE O/P NEW LOW 30 MIN: CPT | Performed by: OTHER

## 2018-04-03 NOTE — PROGRESS NOTES
Mr. Melissa Higginbotham, is in the office with his wife. He followed with me in my previous practice. He relates his first seizure was in the late 1980s while in [facility].   He had MRI scan in [facility] as well as at [facility].  His last seizure was over 10 years Seizures [facility])       Past Surgical History:  2007: COLONOSCOPY      Comment: [place], normal  11/30/2017: COLONOSCOPY N/A      Comment: Procedure: COLONOSCOPY;  Surgeon: Johnathon Yip MD;  Location: [address] [place] [address] Bruits, Regular Rate and Rhythm  GI: + Bowel sounds, soft. Resp: Clear, no Wheezes  Extremities: NO edema, no erythema. Neuro:  Higher Integrative Functions:  Alert, Oriented *3, Fluent, conversational, repetition and naming intact.  Short Term Memory in carBAMazepine 100 MG Oral Chew Tab CHEW AND SWALLOW 1 TABLET EVERY DAY Disp: 90 tablet Rfl: 3   carBAMazepine 200 MG Oral Tab TAKE ONE TABLET BY MOUTH TWICE A DAY Disp: 180 tablet Rfl: 3   aspirin 81 MG Oral Tab EC Take 1 tablet (81 mg total) by mouth da

## 2018-04-04 PROBLEM — Z79.899 ENCOUNTER FOR LONG-TERM (CURRENT) USE OF HIGH-RISK MEDICATION: Status: ACTIVE | Noted: 2018-04-04

## 2018-06-22 RX ORDER — CARBAMAZEPINE 100 MG/1
TABLET, CHEWABLE ORAL
Qty: 90 TABLET | Refills: 0 | Status: SHIPPED | OUTPATIENT
Start: 2018-06-22 | End: 2018-11-02

## 2018-06-22 RX ORDER — CARBAMAZEPINE 200 MG/1
TABLET ORAL
Qty: 180 TABLET | Refills: 0 | Status: SHIPPED | OUTPATIENT
Start: 2018-06-22 | End: 2018-09-18

## 2018-06-23 NOTE — TELEPHONE ENCOUNTER
Refill Protocol Appointment Criteria  · Appointment scheduled in the past 6 months or in the next 3 months  Recent Outpatient Visits            2 months ago Nonintractable generalized idiopathic epilepsy without status epilepticus ([facility] [address]    [facility]

## 2018-07-02 ENCOUNTER — APPOINTMENT (OUTPATIENT)
Dept: LAB | Age: 68
End: 2018-07-02
Attending: FAMILY MEDICINE
Payer: MEDICARE

## 2018-07-02 ENCOUNTER — HOSPITAL ENCOUNTER (OUTPATIENT)
Dept: GENERAL RADIOLOGY | Age: 68
Discharge: HOME | End: 2018-07-02
Attending: FAMILY MEDICINE
Payer: MEDICARE

## 2018-07-02 ENCOUNTER — OFFICE VISIT (OUTPATIENT)
Dept: FAMILY MEDICINE CLINIC | Facility: CLINIC | Age: 68
End: 2018-07-02

## 2018-07-02 VITALS
SYSTOLIC BLOOD PRESSURE: 126 MMHG | DIASTOLIC BLOOD PRESSURE: 73 MMHG | WEIGHT: 182 LBS | HEIGHT: 65 IN | BODY MASS INDEX: 30.32 KG/M2 | TEMPERATURE: 99 F | HEART RATE: 93 BPM

## 2018-07-02 DIAGNOSIS — I51.7 CARDIOMEGALY: ICD-10-CM

## 2018-07-02 DIAGNOSIS — I10 ESSENTIAL HYPERTENSION: ICD-10-CM

## 2018-07-02 DIAGNOSIS — M79.89 LEG SWELLING: ICD-10-CM

## 2018-07-02 DIAGNOSIS — F17.200 SMOKER: ICD-10-CM

## 2018-07-02 DIAGNOSIS — M79.89 LEG SWELLING: Primary | ICD-10-CM

## 2018-07-02 LAB
ANION GAP SERPL CALC-SCNC: 9 MMOL/L (ref 0–18)
BUN SERPL-MCNC: 16 MG/DL (ref 8–20)
BUN/CREAT SERPL: 27.6 (ref 10–20)
CALCIUM SERPL-MCNC: 9.4 MG/DL (ref 8.5–10.5)
CHLORIDE SERPL-SCNC: 106 MMOL/L (ref 95–110)
CO2 SERPL-SCNC: 26 MMOL/L (ref 22–32)
CREAT SERPL-MCNC: 0.58 MG/DL (ref 0.5–1.5)
D DIMER PPP FEU-MCNC: <0.27 MCG/ML (ref ?–0.7)
GLUCOSE SERPL-MCNC: 94 MG/DL (ref 70–99)
OSMOLALITY UR CALC.SUM OF ELEC: 293 MOSM/KG (ref 275–295)
POTASSIUM SERPL-SCNC: 3.9 MMOL/L (ref 3.3–5.1)
SODIUM SERPL-SCNC: 141 MMOL/L (ref 136–144)
TSH SERPL-ACNC: 0.99 UIU/ML (ref 0.45–5.33)

## 2018-07-02 PROCEDURE — 80048 BASIC METABOLIC PNL TOTAL CA: CPT

## 2018-07-02 PROCEDURE — 84443 ASSAY THYROID STIM HORMONE: CPT

## 2018-07-02 PROCEDURE — 85379 FIBRIN DEGRADATION QUANT: CPT

## 2018-07-02 PROCEDURE — 71046 X-RAY EXAM CHEST 2 VIEWS: CPT | Performed by: FAMILY MEDICINE

## 2018-07-02 PROCEDURE — 99214 OFFICE O/P EST MOD 30 MIN: CPT | Performed by: FAMILY MEDICINE

## 2018-07-02 PROCEDURE — 36415 COLL VENOUS BLD VENIPUNCTURE: CPT

## 2018-07-02 PROCEDURE — G0463 HOSPITAL OUTPT CLINIC VISIT: HCPCS | Performed by: FAMILY MEDICINE

## 2018-07-02 RX ORDER — FUROSEMIDE 20 MG/1
20 TABLET ORAL EVERY MORNING
Qty: 30 TABLET | Refills: 0 | Status: SHIPPED | OUTPATIENT
Start: 2018-07-02 | End: 2019-02-14 | Stop reason: ALTCHOICE

## 2018-07-02 NOTE — PROGRESS NOTES
HPI:    Patient ID: Ovi Cortes is a 77year old male. HPI     Patient presents with:  Swelling: on both feet X 3 months feet are also itching a lot       Patient denies any sob, cp.   Had hx of Hypertension, cardiomegaly  Last echo jan 2017    S Oral Tab Take 1 tablet (25 mg total) by mouth every morning.  With banana or orange juice Disp: 90 tablet Rfl: 3   Calcium Citrate-Vitamin D 1000-400 Oral Liquid  Disp:  Rfl:    Multiple Vitamins-Minerals (ANTIOXIDANT) Oral Cap  Disp:  Rfl:    DIVALPROEX SO

## 2018-07-09 ENCOUNTER — OFFICE VISIT (OUTPATIENT)
Dept: FAMILY MEDICINE CLINIC | Facility: CLINIC | Age: 68
End: 2018-07-09

## 2018-07-09 VITALS
DIASTOLIC BLOOD PRESSURE: 71 MMHG | HEIGHT: 65 IN | HEART RATE: 92 BPM | SYSTOLIC BLOOD PRESSURE: 137 MMHG | BODY MASS INDEX: 30.66 KG/M2 | WEIGHT: 184 LBS | TEMPERATURE: 98 F

## 2018-07-09 DIAGNOSIS — M79.89 LEG SWELLING: Primary | ICD-10-CM

## 2018-07-09 DIAGNOSIS — I51.7 CARDIOMEGALY: ICD-10-CM

## 2018-07-09 DIAGNOSIS — L03.119 CELLULITIS OF LOWER EXTREMITY, UNSPECIFIED LATERALITY: ICD-10-CM

## 2018-07-09 PROCEDURE — 99214 OFFICE O/P EST MOD 30 MIN: CPT | Performed by: FAMILY MEDICINE

## 2018-07-09 PROCEDURE — G0463 HOSPITAL OUTPT CLINIC VISIT: HCPCS | Performed by: FAMILY MEDICINE

## 2018-07-09 RX ORDER — CEPHALEXIN 500 MG/1
500 CAPSULE ORAL 4 TIMES DAILY
Qty: 28 CAPSULE | Refills: 0 | Status: SHIPPED | OUTPATIENT
Start: 2018-07-09 | End: 2018-07-09

## 2018-07-09 RX ORDER — CEPHALEXIN 500 MG/1
500 CAPSULE ORAL 4 TIMES DAILY
Qty: 28 CAPSULE | Refills: 0 | Status: SHIPPED | OUTPATIENT
Start: 2018-07-09 | End: 2018-07-16

## 2018-07-10 NOTE — PROGRESS NOTES
HPI:    Patient ID: July Arana is a 77year old male. HPI     Patient here for follow-up on his lower extremity swelling. He did have a chest x--ray has also had blood work. Chest x-ray showed no acute changes.     Component      Latest Ref CARBAMAZEPINE 100 MG Oral Chew Tab CHEW AND SWALLOW 1 TABLET EVERY DAY Disp: 90 tablet Rfl: 0   hydrochlorothiazide 25 MG Oral Tab Take 1 tablet (25 mg total) by mouth every morning.  With banana or orange juice Disp: 90 tablet Rfl: 3   Calcium Citrate-Vi Cap 28 capsule 0      Sig: Take 1 capsule (500 mg total) by mouth 4 (four) times daily.            Imaging & Referrals:  CARDIO - INTERNAL  CARD ECHO 2D DOPPLER (CPT=93306)       WX#[id number]

## 2018-07-13 ENCOUNTER — HOSPITAL ENCOUNTER (OUTPATIENT)
Dept: CARDIOLOGY CLINIC | Age: 68
Discharge: HOME | End: 2018-07-13
Attending: FAMILY MEDICINE
Payer: MEDICARE

## 2018-07-13 DIAGNOSIS — I51.7 CARDIOMEGALY: ICD-10-CM

## 2018-07-13 DIAGNOSIS — M79.89 LEG SWELLING: ICD-10-CM

## 2018-07-13 PROCEDURE — 93306 TTE W/DOPPLER COMPLETE: CPT | Performed by: FAMILY MEDICINE

## 2018-07-15 ENCOUNTER — HOSPITAL ENCOUNTER (EMERGENCY)
Facility: HOSPITAL | Age: 68
Discharge: HOME | End: 2018-07-15
Attending: EMERGENCY MEDICINE
Payer: MEDICARE

## 2018-07-15 VITALS
HEIGHT: 65 IN | SYSTOLIC BLOOD PRESSURE: 135 MMHG | HEART RATE: 84 BPM | OXYGEN SATURATION: 95 % | TEMPERATURE: 99 F | WEIGHT: 182 LBS | BODY MASS INDEX: 30.32 KG/M2 | RESPIRATION RATE: 16 BRPM | DIASTOLIC BLOOD PRESSURE: 62 MMHG

## 2018-07-15 DIAGNOSIS — L03.119 CELLULITIS OF LOWER EXTREMITY, UNSPECIFIED LATERALITY: Primary | ICD-10-CM

## 2018-07-15 LAB
ALBUMIN SERPL BCP-MCNC: 3.7 G/DL (ref 3.5–4.8)
ALP SERPL-CCNC: 60 U/L (ref 32–100)
ALT SERPL-CCNC: 22 U/L (ref 17–63)
ANION GAP SERPL CALC-SCNC: 8 MMOL/L (ref 0–18)
AST SERPL-CCNC: 21 U/L (ref 15–41)
BASOPHILS # BLD: 0.1 K/UL (ref 0–0.2)
BASOPHILS NFR BLD: 1 %
BILIRUB DIRECT SERPL-MCNC: 0.2 MG/DL (ref 0–0.2)
BILIRUB SERPL-MCNC: 0.4 MG/DL (ref 0.3–1.2)
BNP SERPL-MCNC: 26 PG/ML (ref 0–100)
BUN SERPL-MCNC: 21 MG/DL (ref 8–20)
BUN/CREAT SERPL: 33.9 (ref 10–20)
CALCIUM SERPL-MCNC: 9.1 MG/DL (ref 8.5–10.5)
CHLORIDE SERPL-SCNC: 107 MMOL/L (ref 95–110)
CO2 SERPL-SCNC: 24 MMOL/L (ref 22–32)
CREAT SERPL-MCNC: 0.62 MG/DL (ref 0.5–1.5)
EOSINOPHIL # BLD: 0.5 K/UL (ref 0–0.7)
EOSINOPHIL NFR BLD: 7 %
ERYTHROCYTE [DISTWIDTH] IN BLOOD BY AUTOMATED COUNT: 14.2 % (ref 11–15)
GLUCOSE SERPL-MCNC: 110 MG/DL (ref 70–99)
HCT VFR BLD AUTO: 41.8 % (ref 41–52)
HGB BLD-MCNC: 14.1 G/DL (ref 13.5–17.5)
LYMPHOCYTES # BLD: 2.3 K/UL (ref 1–4)
LYMPHOCYTES NFR BLD: 32 %
MCH RBC QN AUTO: 31.9 PG (ref 27–32)
MCHC RBC AUTO-ENTMCNC: 33.8 G/DL (ref 32–37)
MCV RBC AUTO: 94.4 FL (ref 80–100)
MONOCYTES # BLD: 0.8 K/UL (ref 0–1)
MONOCYTES NFR BLD: 11 %
NEUTROPHILS # BLD AUTO: 3.5 K/UL (ref 1.8–7.7)
NEUTROPHILS NFR BLD: 49 %
OSMOLALITY UR CALC.SUM OF ELEC: 292 MOSM/KG (ref 275–295)
PLATELET # BLD AUTO: 235 K/UL (ref 140–400)
PMV BLD AUTO: 8.1 FL (ref 7.4–10.3)
POTASSIUM SERPL-SCNC: 3.8 MMOL/L (ref 3.3–5.1)
PROT SERPL-MCNC: 6.9 G/DL (ref 5.9–8.4)
RBC # BLD AUTO: 4.42 M/UL (ref 4.5–5.9)
SODIUM SERPL-SCNC: 139 MMOL/L (ref 136–144)
WBC # BLD AUTO: 7.2 K/UL (ref 4–11)

## 2018-07-15 PROCEDURE — 83880 ASSAY OF NATRIURETIC PEPTIDE: CPT | Performed by: EMERGENCY MEDICINE

## 2018-07-15 PROCEDURE — 96375 TX/PRO/DX INJ NEW DRUG ADDON: CPT

## 2018-07-15 PROCEDURE — 85025 COMPLETE CBC W/AUTO DIFF WBC: CPT | Performed by: EMERGENCY MEDICINE

## 2018-07-15 PROCEDURE — 96366 THER/PROPH/DIAG IV INF ADDON: CPT

## 2018-07-15 PROCEDURE — 80048 BASIC METABOLIC PNL TOTAL CA: CPT | Performed by: EMERGENCY MEDICINE

## 2018-07-15 PROCEDURE — 96365 THER/PROPH/DIAG IV INF INIT: CPT

## 2018-07-15 PROCEDURE — 80076 HEPATIC FUNCTION PANEL: CPT | Performed by: EMERGENCY MEDICINE

## 2018-07-15 PROCEDURE — 99284 EMERGENCY DEPT VISIT MOD MDM: CPT

## 2018-07-15 RX ORDER — FUROSEMIDE 10 MG/ML
40 INJECTION INTRAMUSCULAR; INTRAVENOUS ONCE
Status: COMPLETED | OUTPATIENT
Start: 2018-07-15 | End: 2018-07-15

## 2018-07-15 RX ORDER — CLINDAMYCIN HYDROCHLORIDE 300 MG/1
300 CAPSULE ORAL 3 TIMES DAILY
Qty: 15 CAPSULE | Refills: 0 | Status: SHIPPED | OUTPATIENT
Start: 2018-07-15 | End: 2018-07-20

## 2018-07-16 ENCOUNTER — TELEPHONE (OUTPATIENT)
Dept: OTHER | Age: 68
End: 2018-07-16

## 2018-07-16 NOTE — TELEPHONE ENCOUNTER
Can see tomorrow at 8.15 am in [place] as add on no back pain, no gout, no musculoskeletal pain, no neck pain, and no weakness.

## 2018-07-16 NOTE — ED INITIAL ASSESSMENT (HPI)
Pt has bilateral leg swelling with redness and warmth for the past 2-3 weeks. Pt seen his pmd and was given lasix has been taking medications with no relief. Pt denies pain or sob at this time.

## 2018-07-16 NOTE — ED NOTES
Received pt from triage. Pt here with complaints of increased swelling and redness to BLE. Worse on the right. Pt stated he is already taking antibiotics and a water pill with minimal relief.  Dr. Vanessa Carlton at [facility] for eval.

## 2018-07-16 NOTE — TELEPHONE ENCOUNTER
Wife is asking when can the patient be seen. The patient is to follow up in 1 day. No available appointments and only wants to see Dr. Aaliyah Barrios. Wife called and stated patient was seen in the Ed on 7/15/18 for the cellulitis in his leg.    Was given La

## 2018-07-17 ENCOUNTER — OFFICE VISIT (OUTPATIENT)
Dept: FAMILY MEDICINE CLINIC | Facility: CLINIC | Age: 68
End: 2018-07-17

## 2018-07-17 VITALS
WEIGHT: 183 LBS | HEART RATE: 84 BPM | SYSTOLIC BLOOD PRESSURE: 131 MMHG | TEMPERATURE: 99 F | BODY MASS INDEX: 30.49 KG/M2 | HEIGHT: 65 IN | DIASTOLIC BLOOD PRESSURE: 68 MMHG

## 2018-07-17 DIAGNOSIS — I10 ESSENTIAL HYPERTENSION: ICD-10-CM

## 2018-07-17 DIAGNOSIS — L03.116 BILATERAL LOWER LEG CELLULITIS: Primary | ICD-10-CM

## 2018-07-17 DIAGNOSIS — L03.115 BILATERAL LOWER LEG CELLULITIS: Primary | ICD-10-CM

## 2018-07-17 PROCEDURE — 99214 OFFICE O/P EST MOD 30 MIN: CPT | Performed by: FAMILY MEDICINE

## 2018-07-17 PROCEDURE — G0463 HOSPITAL OUTPT CLINIC VISIT: HCPCS | Performed by: FAMILY MEDICINE

## 2018-07-17 NOTE — PROGRESS NOTES
HPI:    Patient ID: Denise Lainez is a 77year old male. HPI     Patient to follow-up on the leg cellulitis. Patient was having swelling in his legs for the last 1-2 weeks. He was prescribed Lasix about 2 weeks ago. He has been taking that.   He while taking lasix, take with glass of OJ Disp: 30 tablet Rfl: 0   CARBAMAZEPINE 200 MG Oral Tab TAKE 1 TABLET TWICE DAILY Disp: 180 tablet Rfl: 0   CARBAMAZEPINE 100 MG Oral Chew Tab CHEW AND SWALLOW 1 TABLET EVERY DAY Disp: 90 tablet Rfl: 0   Calcium Cit encounter    Imaging & Referrals:  None       [id number]

## 2018-07-17 NOTE — ED PROVIDER NOTES
Patient Seen in: [facility] AND [facility] Emergency Department    History   Patient presents with:  Swelling Edema (cardiovascular, metabolic)    Stated Complaint: lower leg swelling    HPI    Patient complains of swelling in legs with redness, cracking of skin Heart Disorder Neg    • Glaucoma Neg        Smoking status: Current Every Day Smoker                                                   Packs/day: 0.25      Years: 40.00        Types: Cigarettes  Smokeless tobacco: Never Used                      Comment: 4 within normal limits   BNP (B TYPE NATRIUERTIC PEPTIDE) - Normal   HEPATIC FUNCTION PANEL (7) - Normal   CBC WITH DIFFERENTIAL WITH PLATELET    Narrative: The following orders were created for panel order CBC WITH DIFFERENTIAL WITH PLATELET.   Procedure Prescribed:  Discharge Medication List as of 7/15/2018 11:24 PM    START taking these medications    Clindamycin HCl 300 MG Oral Cap  Take 1 capsule (300 mg total) by mouth 3 (three) times daily. , Print Script, Disp-15 capsule, R-0

## 2018-07-26 ENCOUNTER — OFFICE VISIT (OUTPATIENT)
Dept: FAMILY MEDICINE CLINIC | Facility: CLINIC | Age: 68
End: 2018-07-26

## 2018-07-26 ENCOUNTER — HOSPITAL ENCOUNTER (OUTPATIENT)
Dept: ULTRASOUND IMAGING | Facility: HOSPITAL | Age: 68
Discharge: HOME | End: 2018-07-26
Attending: FAMILY MEDICINE
Payer: MEDICARE

## 2018-07-26 VITALS
DIASTOLIC BLOOD PRESSURE: 67 MMHG | BODY MASS INDEX: 30.16 KG/M2 | HEART RATE: 94 BPM | TEMPERATURE: 99 F | WEIGHT: 181 LBS | SYSTOLIC BLOOD PRESSURE: 107 MMHG | HEIGHT: 65 IN

## 2018-07-26 DIAGNOSIS — I87.2 STASIS DERMATITIS OF BOTH LEGS: ICD-10-CM

## 2018-07-26 DIAGNOSIS — M79.89 LEG SWELLING: Primary | ICD-10-CM

## 2018-07-26 DIAGNOSIS — L03.116 BILATERAL LOWER LEG CELLULITIS: ICD-10-CM

## 2018-07-26 DIAGNOSIS — M79.89 LEG SWELLING: ICD-10-CM

## 2018-07-26 DIAGNOSIS — L03.115 BILATERAL LOWER LEG CELLULITIS: ICD-10-CM

## 2018-07-26 PROCEDURE — 99214 OFFICE O/P EST MOD 30 MIN: CPT | Performed by: FAMILY MEDICINE

## 2018-07-26 PROCEDURE — G0463 HOSPITAL OUTPT CLINIC VISIT: HCPCS | Performed by: FAMILY MEDICINE

## 2018-07-26 PROCEDURE — 93970 EXTREMITY STUDY: CPT | Performed by: FAMILY MEDICINE

## 2018-07-26 NOTE — PROGRESS NOTES
HPI:    Patient ID: Kailyn Matias is a 77year old male. HPI     Patient to follow-up on his leg swelling. Leg swelling has improved slightly but still persisting. He was diagnosed with cellulitis and was started on antibiotics.   Patient was scr Disp:  Rfl:    DIVALPROEX SODIUM 500 MG Oral Tab EC DR tab TAKE 1 TABLET TWICE DAILY Disp: 180 tablet Rfl: 0   simvastatin 20 MG Oral Tab TAKE 1 TABLET EVERY NIGHT Disp: 90 tablet Rfl: 3   aspirin 81 MG Oral Tab EC Take 1 tablet (81 mg total) by mouth nate

## 2018-08-01 ENCOUNTER — TELEPHONE (OUTPATIENT)
Dept: FAMILY MEDICINE CLINIC | Facility: CLINIC | Age: 68
End: 2018-08-01

## 2018-08-04 RX ORDER — SIMVASTATIN 20 MG
TABLET ORAL
Qty: 90 TABLET | Refills: 0 | Status: SHIPPED | OUTPATIENT
Start: 2018-08-04 | End: 2018-10-23

## 2018-08-04 NOTE — TELEPHONE ENCOUNTER
Cholesterol Medications  Protocol Criteria:  · Appointment scheduled in the past 12 months or in the next 3 months  · ALT & LDL on file in the past 12 months  · ALT result < 80  · LDL result <130   Recent Outpatient Visits            1 week ago Leg swellin

## 2018-08-14 ENCOUNTER — OFFICE VISIT (OUTPATIENT)
Dept: FAMILY MEDICINE CLINIC | Facility: CLINIC | Age: 68
End: 2018-08-14

## 2018-08-14 VITALS
SYSTOLIC BLOOD PRESSURE: 135 MMHG | TEMPERATURE: 99 F | HEART RATE: 97 BPM | WEIGHT: 179 LBS | DIASTOLIC BLOOD PRESSURE: 74 MMHG | BODY MASS INDEX: 30 KG/M2

## 2018-08-14 DIAGNOSIS — M79.89 LEG SWELLING: ICD-10-CM

## 2018-08-14 DIAGNOSIS — I10 ESSENTIAL HYPERTENSION: Primary | ICD-10-CM

## 2018-08-14 DIAGNOSIS — F17.200 SMOKER: ICD-10-CM

## 2018-08-14 PROCEDURE — G0463 HOSPITAL OUTPT CLINIC VISIT: HCPCS | Performed by: FAMILY MEDICINE

## 2018-08-14 PROCEDURE — 99214 OFFICE O/P EST MOD 30 MIN: CPT | Performed by: FAMILY MEDICINE

## 2018-08-14 NOTE — PROGRESS NOTES
HPI:    Patient ID: Jaymie Jenkins is a 77year old male. HPI     Patient presents with:  Leg Swelling: bilateral-follow up    Patient to follow-up on his leg swelling. Swelling is much improved.   He recently completed Lasix and is switched over t hydrochlorothiazide 25 MG Oral Tab Take 1 tablet (25 mg total) by mouth every morning.  With banana or orange juice Disp: 90 tablet Rfl: 3   Calcium Citrate-Vitamin D 1000-400 Oral Liquid  Disp:  Rfl:    Multiple Vitamins-Minerals (ANTIOXIDANT) Oral Cap

## 2018-08-22 NOTE — TELEPHONE ENCOUNTER
Refill request:      •  DIVALPROEX SODIUM 500 MG Oral Tab EC DR tab, TAKE 1 TABLET TWICE DAILY, Disp: 180 tablet, Rfl: 0

## 2018-08-23 RX ORDER — DIVALPROEX SODIUM 500 MG/1
TABLET, DELAYED RELEASE ORAL
Qty: 180 TABLET | Refills: 0 | Status: SHIPPED | OUTPATIENT
Start: 2018-08-23 | End: 2018-11-02

## 2018-08-23 NOTE — TELEPHONE ENCOUNTER
Refill Protocol Appointment Criteria  · Appointment scheduled in the past 6 months or in the next 3 months  Recent Outpatient Visits            1 week ago Essential hypertension    Haydee Baig MD    Office Visit    3

## 2018-09-04 ENCOUNTER — TELEPHONE (OUTPATIENT)
Dept: GASTROENTEROLOGY | Facility: CLINIC | Age: 68
End: 2018-09-04

## 2018-09-04 DIAGNOSIS — Z86.010 HISTORY OF COLONIC POLYPS: Primary | ICD-10-CM

## 2018-09-04 NOTE — TELEPHONE ENCOUNTER
----- Message from Annita Morales RN sent at 2017  7:58 AM CST -----  Regardin year CLN recall  Entered into EPIC:Recall colon in 1 year per Dr. Milly Newell. Last  Colon done 2017, next due 2018. Snapshot updated.

## 2018-09-18 RX ORDER — CARBAMAZEPINE 200 MG/1
TABLET ORAL
Qty: 180 TABLET | Refills: 0 | Status: SHIPPED | OUTPATIENT
Start: 2018-09-18 | End: 2018-12-22

## 2018-09-18 NOTE — TELEPHONE ENCOUNTER
LOV one month ago. Please advise.    Refill Protocol Appointment Criteria  · Appointment scheduled in the past 6 months or in the next 3 months  Recent Outpatient Visits            1 month ago Essential hypertension    [address], [place]

## 2018-10-11 RX ORDER — POLYETHYLENE GLYCOL 3350, SODIUM CHLORIDE, SODIUM BICARBONATE, POTASSIUM CHLORIDE 420; 11.2; 5.72; 1.48 G/4L; G/4L; G/4L; G/4L
4 POWDER, FOR SOLUTION ORAL
Qty: 1 BOTTLE | Refills: 0 | Status: SHIPPED | OUTPATIENT
Start: 2018-10-11 | End: 2018-10-11

## 2018-10-11 NOTE — TELEPHONE ENCOUNTER
Last Procedure: 11/30/18    Last Diagnosis:  1. Suboptimal colon preparation. 2.  Status post polypectomy x2 diminutive polyps, rule out adenomas. 3. Internal hemorrhoids.   Recalled for (years): 1 year  Sedation used previously:  MAC  Last Prep Used (if

## 2018-10-11 NOTE — TELEPHONE ENCOUNTER
Okay to schedule colonoscopy, diagnosis, history of colon adenomas. Split dose TriLyte preparation. 2 days of clear liquids instead of 1. At hospital only. (Due to history of seizure disorder).   Patient should continue his seizure medication up until t

## 2018-10-23 RX ORDER — CARBAMAZEPINE 100 MG/1
TABLET, CHEWABLE ORAL
Qty: 90 TABLET | Refills: 0 | OUTPATIENT
Start: 2018-10-23

## 2018-10-23 RX ORDER — SIMVASTATIN 20 MG
TABLET ORAL
Qty: 90 TABLET | Refills: 0 | Status: SHIPPED | OUTPATIENT
Start: 2018-10-23 | End: 2018-12-22

## 2018-10-23 NOTE — TELEPHONE ENCOUNTER
Scheduled for:  Colonoscopy [address]  Provider Name: Dr. Meagan Glaser  Date:  12/7/18  Location:  [facility]  Sedation:  MAC  Time:  1330 (pt is aware to arrive at 1230)   Prep:  Trilyte, mailed 10/23/18   Clear liquid diet x 2 days  Meds/Allergies Reconciled?: Physician re

## 2018-10-24 NOTE — TELEPHONE ENCOUNTER
Refill Protocol Appointment Criteria  · Appointment scheduled in the past 6 months or in the next 3 months  Recent Outpatient Visits            3 weeks ago Bilateral lower extremity edema    Maira Estrada MD    Office Visit protocol

## 2018-11-02 RX ORDER — CARBAMAZEPINE 100 MG/1
TABLET, CHEWABLE ORAL
Qty: 90 TABLET | Refills: 0 | Status: SHIPPED | OUTPATIENT
Start: 2018-11-02 | End: 2018-12-22

## 2018-11-02 RX ORDER — DIVALPROEX SODIUM 500 MG/1
TABLET, DELAYED RELEASE ORAL
Qty: 180 TABLET | Refills: 0 | Status: SHIPPED | OUTPATIENT
Start: 2018-11-02 | End: 2018-12-22

## 2018-11-03 NOTE — TELEPHONE ENCOUNTER
Refill passed per [address] protocol.   Refill Protocol Appointment Criteria  · Appointment scheduled in the past 6 months or in the next 3 months  Recent Outpatient Visits            1 month ago Bilateral lower extremity edema    [address]

## 2018-11-06 ENCOUNTER — TELEPHONE (OUTPATIENT)
Dept: GASTROENTEROLOGY | Facility: CLINIC | Age: 68
End: 2018-11-06

## 2018-11-06 DIAGNOSIS — Z86.010 HX OF COLONIC POLYPS: Primary | ICD-10-CM

## 2018-11-06 NOTE — TELEPHONE ENCOUNTER
Pts wife/Yee calling for pt to Cancel CLN rosa 12/7/18 due to emergency in [facility] [place] [facility]. Pt will cb once he has returned, no need to call unless questions, thanks.

## 2018-11-07 NOTE — TELEPHONE ENCOUNTER
Spoke to pts wife (okzheng per FYI in [facility] verbal release) and pt still wants to cancel procedure for  12/7/18    I canceled in [facility], sent a Surgical Case Change Request, fpt will call back when he returns from [facility] to reschedule    [address]

## 2018-12-18 ENCOUNTER — TELEPHONE (OUTPATIENT)
Dept: GASTROENTEROLOGY | Facility: CLINIC | Age: 68
End: 2018-12-18

## 2018-12-18 DIAGNOSIS — Z86.010 PERSONAL HISTORY OF COLONIC POLYPS: Primary | ICD-10-CM

## 2018-12-19 ENCOUNTER — PATIENT OUTREACH (OUTPATIENT)
Dept: CASE MANAGEMENT | Age: 68
End: 2018-12-19

## 2018-12-19 NOTE — TELEPHONE ENCOUNTER
Scheduled for:  Colonoscopy - [address]755  Provider Name:  Dr. Brenda Baron  Date:  1/3/19  Location:  [facility]  Sedation:  MAC  Time:  8:30 am (pt is aware to arrive at 7:30 am)  Prep:  Clear liquid x2 days, Trilyte, Prep instructions were given to pt over the phone, pt kandi

## 2018-12-19 NOTE — PROGRESS NOTES
Outreached to patient (spoke to patient's spouse verified consent) in regards to scheduling Medicare Annual exam (AHA). Patient appt scheduled with his PCP for 02/14/2019 . Thank you.

## 2018-12-23 RX ORDER — CARBAMAZEPINE 200 MG/1
TABLET ORAL
Qty: 180 TABLET | Refills: 0 | Status: SHIPPED | OUTPATIENT
Start: 2018-12-23 | End: 2019-02-14

## 2018-12-23 RX ORDER — CARBAMAZEPINE 100 MG/1
TABLET, CHEWABLE ORAL
Qty: 90 TABLET | Refills: 0 | Status: SHIPPED | OUTPATIENT
Start: 2018-12-23 | End: 2019-02-14

## 2018-12-23 RX ORDER — DIVALPROEX SODIUM 500 MG/1
TABLET, DELAYED RELEASE ORAL
Qty: 180 TABLET | Refills: 0 | Status: SHIPPED | OUTPATIENT
Start: 2018-12-23 | End: 2019-02-14

## 2018-12-23 RX ORDER — SIMVASTATIN 20 MG
TABLET ORAL
Qty: 90 TABLET | Refills: 0 | Status: SHIPPED | OUTPATIENT
Start: 2018-12-23 | End: 2019-02-14

## 2019-01-03 ENCOUNTER — TELEPHONE (OUTPATIENT)
Dept: GASTROENTEROLOGY | Facility: CLINIC | Age: 69
End: 2019-01-03

## 2019-01-03 ENCOUNTER — HOSPITAL ENCOUNTER (OUTPATIENT)
Facility: HOSPITAL | Age: 69
Setting detail: HOSPITAL OUTPATIENT SURGERY
Discharge: HOME | End: 2019-01-03
Attending: INTERNAL MEDICINE | Admitting: INTERNAL MEDICINE
Payer: MEDICARE

## 2019-01-03 ENCOUNTER — ANESTHESIA EVENT (OUTPATIENT)
Dept: ENDOSCOPY | Facility: HOSPITAL | Age: 69
End: 2019-01-03
Payer: MEDICARE

## 2019-01-03 ENCOUNTER — ANESTHESIA (OUTPATIENT)
Dept: ENDOSCOPY | Facility: HOSPITAL | Age: 69
End: 2019-01-03
Payer: MEDICARE

## 2019-01-03 DIAGNOSIS — Z86.010 PERSONAL HISTORY OF COLONIC POLYPS: ICD-10-CM

## 2019-01-03 PROBLEM — K63.5 COLON POLYPS: Status: ACTIVE | Noted: 2019-01-03

## 2019-01-03 PROCEDURE — 0DBK8ZX EXCISION OF ASCENDING COLON, VIA NATURAL OR ARTIFICIAL OPENING ENDOSCOPIC, DIAGNOSTIC: ICD-10-PCS | Performed by: INTERNAL MEDICINE

## 2019-01-03 PROCEDURE — 45385 COLONOSCOPY W/LESION REMOVAL: CPT | Performed by: INTERNAL MEDICINE

## 2019-01-03 RX ORDER — SODIUM CHLORIDE, SODIUM LACTATE, POTASSIUM CHLORIDE, CALCIUM CHLORIDE 600; 310; 30; 20 MG/100ML; MG/100ML; MG/100ML; MG/100ML
INJECTION, SOLUTION INTRAVENOUS CONTINUOUS
Status: DISCONTINUED | OUTPATIENT
Start: 2019-01-03 | End: 2019-01-03

## 2019-01-03 RX ORDER — NALOXONE HYDROCHLORIDE 0.4 MG/ML
80 INJECTION, SOLUTION INTRAMUSCULAR; INTRAVENOUS; SUBCUTANEOUS AS NEEDED
Status: DISCONTINUED | OUTPATIENT
Start: 2019-01-03 | End: 2019-01-03

## 2019-01-03 RX ADMIN — SODIUM CHLORIDE, SODIUM LACTATE, POTASSIUM CHLORIDE, CALCIUM CHLORIDE: 600; 310; 30; 20 INJECTION, SOLUTION INTRAVENOUS at 08:40:00

## 2019-01-03 RX ADMIN — SODIUM CHLORIDE, SODIUM LACTATE, POTASSIUM CHLORIDE, CALCIUM CHLORIDE: 600; 310; 30; 20 INJECTION, SOLUTION INTRAVENOUS at 08:20:00

## 2019-01-03 NOTE — OPERATIVE REPORT
[facility]    PATIENT'S NAME: Citlalli Chapo   ATTENDING PHYSICIAN: Stevan Cosme MD   OPERATING PHYSICIAN: Stevan Cosme MD   PATIENT ACCOUNT#:   [id number]    LOCATION:  [facility] ROOM [address] [place] [address] complication. IMPRESSION:    1. Status post polypectomy x2, rule out adenomas. 2.   Internal hemorrhoids. RECOMMENDATION:  Check pathology results to determine interval for next colonoscopy.      Dictated By Alina Mcclendon MD  d:

## 2019-01-03 NOTE — ANESTHESIA POSTPROCEDURE EVALUATION
Patient: Brittany Orantes    Procedure Summary     Date:  01/03/19 Room / Location:  [address] [place] [address] ENDOSCOPY 01 / [address] [place] [address] ENDOSCOPY    Anesthesia Start:  3873 Anesthesia Stop:      Procedure:  COLONOSCOPY (N/A ) Diagnosis:       Personal history of colonic polyps

## 2019-01-03 NOTE — ANESTHESIA PREPROCEDURE EVALUATION
Anesthesia PreOp Note    HPI:     Néstor Oviedo is a 77year old male who presents for preoperative consultation requested by: Manuel Bang MD    Date of Surgery: 1/3/2019    Procedure(s):  COLONOSCOPY  Indication: Personal history of Frantz Woodall MD at [address] [place] [address] ENDOSCOPY         Medications Prior to Admission:  CARBAMAZEPINE 200 MG Oral Tab TAKE 1 TABLET TWICE DAILY Disp: 180 tablet Rfl: 0 1/1/2019   SIMVASTATIN 20 MG Oral Tab TAKE 1 TABLET EVERY NIGHT Disp: 90 tablet Rfl: 0 1/1/2 insecurity - inability: Not on file      Transportation needs - medical: Not on file      Transportation needs - non-medical: Not on file    Occupational History      Not on file    Tobacco Use      Smoking status: Current Every Day Smoker        Packs/day Anesthesia Plan:   ASA:  3  Plan:   MAC  Informed Consent Plan and Risks Discussed With:  Patient      I have informed [place] and/or legal guardian or family member of the nature of the anesthetic plan, benefits, risks including po

## 2019-01-03 NOTE — H&P
History & Physical Examination    Patient Name: Amie Saunders  MRN: [id number]  [facility]: [id number]  YOB: 1950    Diagnosis: History of colon polyps      Medications Prior to Admission:  CARBAMAZEPINE 200 MG Oral Tab TAKE 1 TABLET TWICE MAGDALENA • Cancer Sister 44        skin   • Cancer Brother         thyroid   • Heart Disorder Neg    • Glaucoma Neg      Social History    Tobacco Use      Smoking status: Current Every Day Smoker        Packs/day: 0.25        Years: 40.00        Pack years: 8

## 2019-01-03 NOTE — BRIEF OP NOTE
Pre-Operative Diagnosis: Personal history of colonic polyps     Post-Operative Diagnosis: Status post polypectomy x2, internal hemorrhoids     Procedure Performed:   Procedure(s):  COLONOSCOPY with polypectomy x2    Surgeon(s) and Role:     * [facility]

## 2019-01-04 ENCOUNTER — TELEPHONE (OUTPATIENT)
Dept: GASTROENTEROLOGY | Facility: CLINIC | Age: 69
End: 2019-01-04

## 2019-01-04 VITALS
SYSTOLIC BLOOD PRESSURE: 141 MMHG | HEIGHT: 66 IN | OXYGEN SATURATION: 96 % | DIASTOLIC BLOOD PRESSURE: 71 MMHG | RESPIRATION RATE: 19 BRPM | HEART RATE: 80 BPM | WEIGHT: 172 LBS | BODY MASS INDEX: 27.64 KG/M2

## 2019-01-04 NOTE — TELEPHONE ENCOUNTER
----- Message from Muhstaq Conde RN sent at 11/12/2018 10:35 AM CST -----  Regarding: Recall colon for 2/2019 per  entered. Recall colon for 2/2019 per  entered.

## 2019-01-04 NOTE — TELEPHONE ENCOUNTER
Entered into Epic:Recall colon in 5 years per Dr. Bolivar Walker. Last Colon done 1/3/19, next due 1/3/24. Snapshot updated. Letter mailed.

## 2019-02-09 PROBLEM — M19.90 OSTEOARTHRITIS: Status: ACTIVE | Noted: 2019-02-09

## 2019-02-09 PROBLEM — I51.89 GRADE I DIASTOLIC DYSFUNCTION: Status: ACTIVE | Noted: 2019-02-09

## 2019-02-14 ENCOUNTER — OFFICE VISIT (OUTPATIENT)
Dept: FAMILY MEDICINE CLINIC | Facility: CLINIC | Age: 69
End: 2019-02-14
Payer: MEDICARE

## 2019-02-14 VITALS
WEIGHT: 182 LBS | TEMPERATURE: 98 F | HEIGHT: 65 IN | HEART RATE: 97 BPM | BODY MASS INDEX: 30.32 KG/M2 | SYSTOLIC BLOOD PRESSURE: 157 MMHG | DIASTOLIC BLOOD PRESSURE: 72 MMHG

## 2019-02-14 DIAGNOSIS — Z87.891 PERSONAL HISTORY OF TOBACCO USE: ICD-10-CM

## 2019-02-14 DIAGNOSIS — I10 ESSENTIAL HYPERTENSION: ICD-10-CM

## 2019-02-14 DIAGNOSIS — E66.3 OVERWEIGHT (BMI 25.0-29.9): ICD-10-CM

## 2019-02-14 DIAGNOSIS — K63.5 POLYP OF COLON, UNSPECIFIED PART OF COLON, UNSPECIFIED TYPE: ICD-10-CM

## 2019-02-14 DIAGNOSIS — G40.909 SEIZURE DISORDER (HCC): ICD-10-CM

## 2019-02-14 DIAGNOSIS — Z00.00 ENCOUNTER FOR ANNUAL HEALTH EXAMINATION: ICD-10-CM

## 2019-02-14 DIAGNOSIS — Z00.00 MEDICARE WELCOME EXAM: Primary | ICD-10-CM

## 2019-02-14 DIAGNOSIS — J43.9 PULMONARY EMPHYSEMA, UNSPECIFIED EMPHYSEMA TYPE (HCC): ICD-10-CM

## 2019-02-14 DIAGNOSIS — I70.0 ATHEROSCLEROSIS OF AORTA (HCC): ICD-10-CM

## 2019-02-14 DIAGNOSIS — Z87.891 PERSONAL HISTORY OF TOBACCO USE, PRESENTING HAZARDS TO HEALTH: ICD-10-CM

## 2019-02-14 DIAGNOSIS — E78.00 HYPERCHOLESTEREMIA: ICD-10-CM

## 2019-02-14 PROCEDURE — G0439 PPPS, SUBSEQ VISIT: HCPCS | Performed by: FAMILY MEDICINE

## 2019-02-14 PROCEDURE — 99406 BEHAV CHNG SMOKING 3-10 MIN: CPT | Performed by: FAMILY MEDICINE

## 2019-02-14 PROCEDURE — 96160 PT-FOCUSED HLTH RISK ASSMT: CPT | Performed by: FAMILY MEDICINE

## 2019-02-14 PROCEDURE — 99397 PER PM REEVAL EST PAT 65+ YR: CPT | Performed by: FAMILY MEDICINE

## 2019-02-14 RX ORDER — CARBAMAZEPINE 100 MG/1
TABLET, CHEWABLE ORAL
Qty: 90 TABLET | Refills: 3 | Status: SHIPPED | OUTPATIENT
Start: 2019-02-14 | End: 2020-07-10

## 2019-02-14 RX ORDER — DIVALPROEX SODIUM 500 MG/1
TABLET, DELAYED RELEASE ORAL
Qty: 180 TABLET | Refills: 3 | Status: SHIPPED | OUTPATIENT
Start: 2019-02-14 | End: 2020-05-26

## 2019-02-14 RX ORDER — CARBAMAZEPINE 200 MG/1
TABLET ORAL
Qty: 180 TABLET | Refills: 3 | Status: SHIPPED | OUTPATIENT
Start: 2019-02-14 | End: 2020-03-15

## 2019-02-14 RX ORDER — HYDROCHLOROTHIAZIDE 25 MG/1
25 TABLET ORAL EVERY MORNING
Qty: 90 TABLET | Refills: 3 | Status: SHIPPED | OUTPATIENT
Start: 2019-02-14 | End: 2020-01-29

## 2019-02-14 RX ORDER — SIMVASTATIN 20 MG
TABLET ORAL
Qty: 90 TABLET | Refills: 3 | Status: SHIPPED | OUTPATIENT
Start: 2019-02-14 | End: 2019-03-09 | Stop reason: DRUGHIGH

## 2019-02-14 NOTE — PROGRESS NOTES
HPI:   Shwetha Philip is a 77year old male who presents for a Medicare Subsequent Annual Wellness visit (Pt already had Initial Annual Wellness).     Just returned from [facility]  Colonoscopy last month  His last annual assessment has been over 1 year: tobacco cessation counseling today.  (update Vitals or Tob Hx section to julia Tobacco Cessation counseling given as YES and refresh this link)        Mr. Mendy Larios already takes aspirin and has it on his medication list.   CAGE Alcohol screening   Kristin Nowak (Office Visit) with Wil Vann MD:  triamcinolone acetonide 0.1 % External Cream Apply topically 2 (two) times daily as needed.    carBAMazepine 200 MG Oral Tab TAKE 1 TABLET TWICE DAILY   simvastatin 20 MG Oral Tab TAKE 1 TABLET EVERY NIGHT   divalproex hearing over the telephone:  No I have trouble following the conversations when two or more people are talking at the same time:  No   I have trouble understanding things on the TV:  No I have to strain to understand conversations:  No   I have to worry ab normal, no murmur, rub or gallop   Abdomen:   Soft, non-tender, bowel sounds active all four quadrants,  no masses, no organomegaly   Genitalia: Not done   Rectal: Not done   Extremities: Extremities normal, atraumatic, no cyanosis or edema   Pulses: 2+ an Oral Tab; Take 1 tablet (25 mg total) by mouth every morning. With banana or orange juice       1. Medicare welcome exam      2. Hypercholesteremia  Check labs  - COMP METABOLIC PANEL (14); Future  - LIPID PANEL; Future    3.  Atherosclerosis of aorta ([facility] [address] including cancer reviewed. Prescription medication ordered. Imaging studies ordered. Lab work ordered. Continue with current treatment plan. call with bp readings 2 weeks    Return in 6 months (on 8/14/2019). Darlyn Land MD, 2/14/2019     General Influenza  Covered Annually 9/30/2018   Please get every year    Pneumococcal 13 (Prevnar)  Covered Once after 65 12/28/2015 Please get once after your 65th birthday    Pneumococcal 23 (Pneumovax)  Covered Once after 65 06/27/2016 Please get once after you

## 2019-02-14 NOTE — PATIENT INSTRUCTIONS
Dbeby Prom SCREENING SCHEDULE   Tests on this list are recommended by your physician but may not be covered, or covered at this frequency, by your insurer. Please check with your insurance carrier before scheduling to verify coverage.     Brisa Bobby Colorectal Cancer Screening Covered up to Age 76     Colonoscopy Screen   Covered every 10 years- more often if abnormal Colonoscopy due on 01/03/2024 Update Health Maintenance if applicable    Flex Sigmoidoscopy Screen  Covered every 5 years No results Not covered by Medicare Part B) No orders found for this or any previous visit.  This may be covered with your prescription benefits, but Medicare does not cover unless Medically needed    Zoster (Not covered by Medicare Part B) No orders found for this or

## 2019-02-26 ENCOUNTER — HOSPITAL ENCOUNTER (OUTPATIENT)
Dept: CT IMAGING | Facility: HOSPITAL | Age: 69
Discharge: HOME | End: 2019-02-26
Attending: FAMILY MEDICINE
Payer: MEDICARE

## 2019-02-26 DIAGNOSIS — Z87.891 PERSONAL HISTORY OF TOBACCO USE: ICD-10-CM

## 2019-03-06 ENCOUNTER — HOSPITAL ENCOUNTER (OUTPATIENT)
Dept: BONE DENSITY | Age: 69
Discharge: HOME | End: 2019-03-06
Attending: FAMILY MEDICINE
Payer: MEDICARE

## 2019-03-06 DIAGNOSIS — Z79.899 ENCOUNTER FOR LONG-TERM (CURRENT) USE OF HIGH-RISK MEDICATION: ICD-10-CM

## 2019-03-06 DIAGNOSIS — G40.909 SEIZURE DISORDER (HCC): ICD-10-CM

## 2019-03-06 PROCEDURE — 77080 DXA BONE DENSITY AXIAL: CPT | Performed by: FAMILY MEDICINE

## 2019-03-08 ENCOUNTER — LAB ENCOUNTER (OUTPATIENT)
Dept: LAB | Age: 69
End: 2019-03-08
Attending: FAMILY MEDICINE
Payer: MEDICARE

## 2019-03-08 DIAGNOSIS — M79.89 LEG SWELLING: ICD-10-CM

## 2019-03-08 DIAGNOSIS — E78.00 HYPERCHOLESTEREMIA: ICD-10-CM

## 2019-03-08 DIAGNOSIS — G40.909 SEIZURE DISORDER (HCC): ICD-10-CM

## 2019-03-08 LAB
ALBUMIN SERPL-MCNC: 3.4 G/DL (ref 3.4–5)
ALBUMIN/GLOB SERPL: 0.8 {RATIO} (ref 1–2)
ALP LIVER SERPL-CCNC: 84 U/L (ref 45–117)
ALT SERPL-CCNC: 28 U/L (ref 16–61)
ANION GAP SERPL CALC-SCNC: 5 MMOL/L (ref 0–18)
AST SERPL-CCNC: 20 U/L (ref 15–37)
BASOPHILS # BLD AUTO: 0.03 X10(3) UL (ref 0–0.2)
BASOPHILS NFR BLD AUTO: 0.4 %
BILIRUB SERPL-MCNC: 0.4 MG/DL (ref 0.1–2)
BUN BLD-MCNC: 16 MG/DL (ref 7–18)
BUN/CREAT SERPL: 22.9 (ref 10–20)
CALCIUM BLD-MCNC: 8.9 MG/DL (ref 8.5–10.1)
CHLORIDE SERPL-SCNC: 106 MMOL/L (ref 98–107)
CHOLEST SMN-MCNC: 192 MG/DL (ref ?–200)
CO2 SERPL-SCNC: 28 MMOL/L (ref 21–32)
CREAT BLD-MCNC: 0.7 MG/DL (ref 0.7–1.3)
DEPRECATED RDW RBC AUTO: 47.6 FL (ref 35.1–46.3)
EOSINOPHIL # BLD AUTO: 0.25 X10(3) UL (ref 0–0.7)
EOSINOPHIL NFR BLD AUTO: 3.6 %
ERYTHROCYTE [DISTWIDTH] IN BLOOD BY AUTOMATED COUNT: 13.8 % (ref 11–15)
GLOBULIN PLAS-MCNC: 4.4 G/DL (ref 2.8–4.4)
GLUCOSE BLD-MCNC: 100 MG/DL (ref 70–99)
HCT VFR BLD AUTO: 41.8 % (ref 39–53)
HDLC SERPL-MCNC: 62 MG/DL (ref 40–59)
HGB BLD-MCNC: 13.8 G/DL (ref 13–17.5)
IMM GRANULOCYTES # BLD AUTO: 0.01 X10(3) UL (ref 0–1)
IMM GRANULOCYTES NFR BLD: 0.1 %
LDLC SERPL CALC-MCNC: 115 MG/DL (ref ?–100)
LYMPHOCYTES # BLD AUTO: 2.39 X10(3) UL (ref 1–4)
LYMPHOCYTES NFR BLD AUTO: 34.7 %
M PROTEIN MFR SERPL ELPH: 7.8 G/DL (ref 6.4–8.2)
MCH RBC QN AUTO: 30.9 PG (ref 26–34)
MCHC RBC AUTO-ENTMCNC: 33 G/DL (ref 31–37)
MCV RBC AUTO: 93.5 FL (ref 80–100)
MONOCYTES # BLD AUTO: 0.72 X10(3) UL (ref 0.1–1)
MONOCYTES NFR BLD AUTO: 10.5 %
NEUTROPHILS # BLD AUTO: 3.48 X10 (3) UL (ref 1.5–7.7)
NEUTROPHILS # BLD AUTO: 3.48 X10(3) UL (ref 1.5–7.7)
NEUTROPHILS NFR BLD AUTO: 50.7 %
NONHDLC SERPL-MCNC: 130 MG/DL (ref ?–130)
OSMOLALITY SERPL CALC.SUM OF ELEC: 289 MOSM/KG (ref 275–295)
PLATELET # BLD AUTO: 249 10(3)UL (ref 150–450)
POTASSIUM SERPL-SCNC: 4 MMOL/L (ref 3.5–5.1)
RBC # BLD AUTO: 4.47 X10(6)UL (ref 3.8–5.8)
SODIUM SERPL-SCNC: 139 MMOL/L (ref 136–145)
TRIGL SERPL-MCNC: 73 MG/DL (ref 30–149)
VLDLC SERPL CALC-MCNC: 15 MG/DL (ref 0–30)
WBC # BLD AUTO: 6.9 X10(3) UL (ref 4–11)

## 2019-03-08 PROCEDURE — 36415 COLL VENOUS BLD VENIPUNCTURE: CPT

## 2019-03-08 PROCEDURE — 80061 LIPID PANEL: CPT

## 2019-03-08 PROCEDURE — 80053 COMPREHEN METABOLIC PANEL: CPT

## 2019-03-08 PROCEDURE — 85025 COMPLETE CBC W/AUTO DIFF WBC: CPT

## 2019-03-09 RX ORDER — SIMVASTATIN 40 MG
40 TABLET ORAL NIGHTLY
Qty: 90 TABLET | Refills: 1 | Status: SHIPPED | OUTPATIENT
Start: 2019-03-09 | End: 2019-09-17

## 2019-08-09 RX ORDER — CEPHALEXIN 500 MG/1
CAPSULE ORAL
Qty: 28 CAPSULE | Refills: 0 | OUTPATIENT
Start: 2019-08-09

## 2019-08-09 NOTE — TELEPHONE ENCOUNTER
Patient received script for cephalexin on 7/9/18 last for cellulitis of lower extremities. Spoke to wife and she stated patient is not having issues with his legs again and that the refill request was sent in error. Refill refused.

## 2019-08-26 ENCOUNTER — OFFICE VISIT (OUTPATIENT)
Dept: FAMILY MEDICINE CLINIC | Facility: CLINIC | Age: 69
End: 2019-08-26
Payer: MEDICARE

## 2019-08-26 VITALS
TEMPERATURE: 98 F | DIASTOLIC BLOOD PRESSURE: 73 MMHG | SYSTOLIC BLOOD PRESSURE: 127 MMHG | BODY MASS INDEX: 30.32 KG/M2 | WEIGHT: 182 LBS | HEART RATE: 88 BPM | HEIGHT: 65 IN

## 2019-08-26 DIAGNOSIS — E78.00 HYPERCHOLESTEREMIA: Primary | ICD-10-CM

## 2019-08-26 DIAGNOSIS — F17.200 SMOKER: ICD-10-CM

## 2019-08-26 DIAGNOSIS — Z12.5 PROSTATE CANCER SCREENING: ICD-10-CM

## 2019-08-26 DIAGNOSIS — R73.9 HYPERGLYCEMIA: ICD-10-CM

## 2019-08-26 DIAGNOSIS — I10 ESSENTIAL HYPERTENSION: ICD-10-CM

## 2019-08-26 DIAGNOSIS — G40.909 SEIZURE DISORDER (HCC): ICD-10-CM

## 2019-08-26 PROCEDURE — 99214 OFFICE O/P EST MOD 30 MIN: CPT | Performed by: FAMILY MEDICINE

## 2019-08-26 NOTE — PROGRESS NOTES
HPI:    Patient ID: Adri Weems is a 68year old male. HPI  Patient presents with:  Cholesterol: follow up     Still smoking maybe 4 cigs a day but wife thinks more. No history of seizure.     Smoking since age 23 yrs    Stopped working 10 yrs Constitutional: He is oriented to person, place, and time. He appears well-developed and well-nourished. Eyes: Pupils are equal, round, and reactive to light. Neck: Normal range of motion. Neck supple. No thyromegaly present.    Cardiovascular: Normal

## 2019-09-04 ENCOUNTER — APPOINTMENT (OUTPATIENT)
Dept: LAB | Age: 69
End: 2019-09-04
Attending: FAMILY MEDICINE
Payer: MEDICARE

## 2019-09-04 DIAGNOSIS — E78.00 HYPERCHOLESTEREMIA: ICD-10-CM

## 2019-09-04 DIAGNOSIS — R73.9 HYPERGLYCEMIA: ICD-10-CM

## 2019-09-04 DIAGNOSIS — Z12.5 PROSTATE CANCER SCREENING: ICD-10-CM

## 2019-09-04 DIAGNOSIS — I10 ESSENTIAL HYPERTENSION: ICD-10-CM

## 2019-09-04 LAB
ALBUMIN SERPL-MCNC: 3.4 G/DL (ref 3.4–5)
ALBUMIN/GLOB SERPL: 0.8 {RATIO} (ref 1–2)
ALP LIVER SERPL-CCNC: 79 U/L (ref 45–117)
ALT SERPL-CCNC: 26 U/L (ref 16–61)
ANION GAP SERPL CALC-SCNC: 6 MMOL/L (ref 0–18)
AST SERPL-CCNC: 18 U/L (ref 15–37)
BILIRUB SERPL-MCNC: 0.3 MG/DL (ref 0.1–2)
BUN BLD-MCNC: 17 MG/DL (ref 7–18)
BUN/CREAT SERPL: 23.6 (ref 10–20)
CALCIUM BLD-MCNC: 9.3 MG/DL (ref 8.5–10.1)
CHLORIDE SERPL-SCNC: 105 MMOL/L (ref 98–112)
CHOLEST SMN-MCNC: 161 MG/DL (ref ?–200)
CO2 SERPL-SCNC: 29 MMOL/L (ref 21–32)
COMPLEXED PSA SERPL-MCNC: 2.27 NG/ML (ref ?–4)
CREAT BLD-MCNC: 0.72 MG/DL (ref 0.7–1.3)
EST. AVERAGE GLUCOSE BLD GHB EST-MCNC: 140 MG/DL (ref 68–126)
GLOBULIN PLAS-MCNC: 4.5 G/DL (ref 2.8–4.4)
GLUCOSE BLD-MCNC: 106 MG/DL (ref 70–99)
HBA1C MFR BLD HPLC: 6.5 % (ref ?–5.7)
HDLC SERPL-MCNC: 46 MG/DL (ref 40–59)
LDLC SERPL CALC-MCNC: 97 MG/DL (ref ?–100)
M PROTEIN MFR SERPL ELPH: 7.9 G/DL (ref 6.4–8.2)
NONHDLC SERPL-MCNC: 115 MG/DL (ref ?–130)
OSMOLALITY SERPL CALC.SUM OF ELEC: 292 MOSM/KG (ref 275–295)
PATIENT FASTING: YES
PATIENT FASTING: YES
POTASSIUM SERPL-SCNC: 3.9 MMOL/L (ref 3.5–5.1)
SODIUM SERPL-SCNC: 140 MMOL/L (ref 136–145)
TRIGL SERPL-MCNC: 91 MG/DL (ref 30–149)
VLDLC SERPL CALC-MCNC: 18 MG/DL (ref 0–30)

## 2019-09-04 PROCEDURE — 80053 COMPREHEN METABOLIC PANEL: CPT

## 2019-09-04 PROCEDURE — 80061 LIPID PANEL: CPT

## 2019-09-04 PROCEDURE — 83036 HEMOGLOBIN GLYCOSYLATED A1C: CPT

## 2019-09-04 PROCEDURE — 36415 COLL VENOUS BLD VENIPUNCTURE: CPT

## 2019-09-17 ENCOUNTER — APPOINTMENT (OUTPATIENT)
Dept: LAB | Age: 69
End: 2019-09-17
Attending: FAMILY MEDICINE
Payer: MEDICARE

## 2019-09-17 ENCOUNTER — OFFICE VISIT (OUTPATIENT)
Dept: FAMILY MEDICINE CLINIC | Facility: CLINIC | Age: 69
End: 2019-09-17
Payer: MEDICARE

## 2019-09-17 VITALS
HEART RATE: 102 BPM | DIASTOLIC BLOOD PRESSURE: 72 MMHG | WEIGHT: 182 LBS | TEMPERATURE: 99 F | SYSTOLIC BLOOD PRESSURE: 139 MMHG | HEIGHT: 65 IN | BODY MASS INDEX: 30.32 KG/M2

## 2019-09-17 DIAGNOSIS — Z23 NEED FOR VACCINATION: ICD-10-CM

## 2019-09-17 DIAGNOSIS — E78.00 HYPERCHOLESTEREMIA: ICD-10-CM

## 2019-09-17 DIAGNOSIS — E11.9 CONTROLLED TYPE 2 DIABETES MELLITUS WITHOUT COMPLICATION, WITHOUT LONG-TERM CURRENT USE OF INSULIN (HCC): ICD-10-CM

## 2019-09-17 DIAGNOSIS — E11.9 CONTROLLED TYPE 2 DIABETES MELLITUS WITHOUT COMPLICATION, WITHOUT LONG-TERM CURRENT USE OF INSULIN (HCC): Primary | ICD-10-CM

## 2019-09-17 DIAGNOSIS — Z23 NEED FOR INFLUENZA VACCINATION: ICD-10-CM

## 2019-09-17 DIAGNOSIS — B35.1 ONYCHOMYCOSIS: ICD-10-CM

## 2019-09-17 LAB
CREAT UR-SCNC: 97.9 MG/DL
MICROALBUMIN UR-MCNC: 1.07 MG/DL
MICROALBUMIN/CREAT 24H UR-RTO: 10.9 UG/MG (ref ?–30)

## 2019-09-17 PROCEDURE — 90662 IIV NO PRSV INCREASED AG IM: CPT | Performed by: FAMILY MEDICINE

## 2019-09-17 PROCEDURE — G0008 ADMIN INFLUENZA VIRUS VAC: HCPCS | Performed by: FAMILY MEDICINE

## 2019-09-17 PROCEDURE — 82043 UR ALBUMIN QUANTITATIVE: CPT

## 2019-09-17 PROCEDURE — 99214 OFFICE O/P EST MOD 30 MIN: CPT | Performed by: FAMILY MEDICINE

## 2019-09-17 PROCEDURE — 82570 ASSAY OF URINE CREATININE: CPT

## 2019-09-17 RX ORDER — SIMVASTATIN 40 MG
40 TABLET ORAL NIGHTLY
Qty: 90 TABLET | Refills: 1 | Status: SHIPPED | OUTPATIENT
Start: 2019-09-17 | End: 2020-01-29

## 2019-09-17 NOTE — PATIENT INSTRUCTIONS
Diet: Diabetes  Food is an important tool that you can use to control diabetes and stay healthy. Eating well-balanced meals in the correct amounts will help you control your blood glucose levels and prevent low blood sugar reactions.  It will also help yo · Avoid added salt. It can contribute to high blood pressure, which can cause heart disease. People with diabetes already have a risk of high blood pressure and heart disease. · Stay at a healthy weight.  If you need to lose weight, cut down on your portio You will be asked about your overall health and any history of foot problems. You’ll also discuss your diabetes history, such as if your blood sugar level has changed over time.  It also includes questions about feelings of pain, tingling, pins and needles, Based on the evaluation, your healthcare provider will create a foot care program for you. This may be as simple as starting a daily self-care routine. And changing the types of shoes you wear.  It may also include treating minor foot problems such as a cor © 4082-4881 [employer] [address]. [address] [facility] [place]. All rights reserved. This information is not intended as a substitute for professional medical care. Always follow your healthcare professional's instructions.         General · Watch your blood sugar as you are told to. Keep a log of your results. This will help your provider change your medicines to keep your blood sugar under control. · Try to reach your ideal weight.  You may be able to cut back on or not have to take diabet · Keep taking your insulin even if you have been vomiting and are feeling sick. Call your provider right away to ask if you need to change your insulin dose. This will depend on your blood sugar results.   · Check your blood sugar every 2 to 4 hours, or at Follow-up with your healthcare provider, or as advised. For more information about diabetes, visit the American Diabetes Association website at www. diabetes. org. Or you can call 293-950-7285.   When to seek medical advice  Call your healthcare provider phyllis

## 2019-09-17 NOTE — PROGRESS NOTES
HPI:    Patient ID: Denise Lainez is a 68year old male.     HPI  Patient presents with:  Lab Results: follow up for lab results for A1C    Component      Latest Ref Rng & Units 9/4/2019 9/4/2019           8:05 AM  8:05 AM   WBC      4.0 - 11.0 x10(3) Cholesterol, Total      <200 mg/dL  161   HDL Cholesterol      40 - 59 mg/dL  46   Triglycerides      30 - 149 mg/dL  91   LDL Cholesterol Calc      <100 mg/dL  97   VLDL      0 - 30 mg/dL  18   NON HDL CHOL      <130 mg/dL  115   HEMOGLOBIN A1c      <5. SWALLOW 1 TABLET EVERY DAY Disp: 90 tablet Rfl: 3   hydrochlorothiazide 25 MG Oral Tab Take 1 tablet (25 mg total) by mouth every morning.  With banana or orange juice Disp: 90 tablet Rfl: 3   Multiple Vitamins-Minerals (ANTIOXIDANT) Oral Cap  Disp:  Rfl: Referrals:  OPHTHALMOLOGY - INTERNAL  PODIATRY - INTERNAL       LN#[id number]

## 2019-11-12 ENCOUNTER — OFFICE VISIT (OUTPATIENT)
Dept: OPTOMETRY | Facility: CLINIC | Age: 69
End: 2019-11-12
Payer: MEDICARE

## 2019-11-12 ENCOUNTER — OFFICE VISIT (OUTPATIENT)
Dept: PODIATRY CLINIC | Facility: CLINIC | Age: 69
End: 2019-11-12
Payer: MEDICARE

## 2019-11-12 DIAGNOSIS — E11.9 CONTROLLED TYPE 2 DIABETES MELLITUS WITHOUT COMPLICATION, WITHOUT LONG-TERM CURRENT USE OF INSULIN (HCC): ICD-10-CM

## 2019-11-12 DIAGNOSIS — E11.9 CONTROLLED TYPE 2 DIABETES MELLITUS WITHOUT COMPLICATION, WITHOUT LONG-TERM CURRENT USE OF INSULIN (HCC): Primary | ICD-10-CM

## 2019-11-12 DIAGNOSIS — H25.13 AGE-RELATED NUCLEAR CATARACT OF BOTH EYES: Primary | ICD-10-CM

## 2019-11-12 PROCEDURE — 99203 OFFICE O/P NEW LOW 30 MIN: CPT | Performed by: PODIATRIST

## 2019-11-12 PROCEDURE — 92014 COMPRE OPH EXAM EST PT 1/>: CPT | Performed by: OPTOMETRIST

## 2019-11-12 NOTE — PROGRESS NOTES
Shwetha Philip is a 68year old male. HPI:     HPI     Diabetic Eye Exam     Diabetes characteristics include Type 2, controlled with diet and taking oral medications. Duration of 1 month. Number of years diabetic: 1 month.   Number of years on pi daily with breakfast. 90 tablet 1   • simvastatin 40 MG Oral Tab Take 1 tablet (40 mg total) by mouth nightly. 90 tablet 1   • Calcium-Phosphorus-Vitamin D (CALCIUM/VITAMIN D3/ADULT GUMMY OR) Take by mouth.      • triamcinolone acetonide 0.1 % External Crea Slit Lamp Exam       Right Left    Lids/Lashes Meibomian gland dysfunction Meibomian gland dysfunction    Conjunctiva/Sclera Nasal/temp pinguecula, Pigmentation Nasal/temp pinguecula, Pigmentation    Cornea 2 small central scars- arcus arcus- faint s

## 2019-11-12 NOTE — PROGRESS NOTES
HPI:    Patient ID: Narciso Lats is a 68year old male. This pleasant 27-year-old male presents to me as a new patient. He is referred by . He is accompanied today by his wife. He states that he is here for a diabetic foot evaluation.   Hi have been cared for and his wife states that she trims them periodically with no apparent problem. He does have some loss of sensation on this evaluation this probably consistent with sensory neuropathy. Hygiene is good and his shoes are accommodative.

## 2019-12-04 ENCOUNTER — TELEPHONE (OUTPATIENT)
Dept: CASE MANAGEMENT | Age: 69
End: 2019-12-04

## 2019-12-04 NOTE — TELEPHONE ENCOUNTER
Pt is eligible to schedule 2020 Medicare Annual Health Assessment visit. Left message to call back [phone].

## 2020-01-01 ENCOUNTER — OFFICE VISIT (OUTPATIENT)
Dept: OPTOMETRY | Facility: CLINIC | Age: 70
End: 2020-01-01
Payer: MEDICARE

## 2020-01-01 ENCOUNTER — OFFICE VISIT (OUTPATIENT)
Dept: PODIATRY CLINIC | Facility: CLINIC | Age: 70
End: 2020-01-01
Payer: MEDICARE

## 2020-01-01 ENCOUNTER — TELEMEDICINE (OUTPATIENT)
Dept: FAMILY MEDICINE CLINIC | Facility: CLINIC | Age: 70
End: 2020-01-01
Payer: MEDICARE

## 2020-01-01 VITALS — WEIGHT: 186 LBS | HEIGHT: 65 IN | BODY MASS INDEX: 30.99 KG/M2

## 2020-01-01 VITALS — DIASTOLIC BLOOD PRESSURE: 78 MMHG | SYSTOLIC BLOOD PRESSURE: 130 MMHG

## 2020-01-01 DIAGNOSIS — F17.200 CURRENT EVERY DAY SMOKER: ICD-10-CM

## 2020-01-01 DIAGNOSIS — B35.1 ONYCHOMYCOSIS: ICD-10-CM

## 2020-01-01 DIAGNOSIS — M79.674 PAIN IN TOES OF BOTH FEET: ICD-10-CM

## 2020-01-01 DIAGNOSIS — E11.9 CONTROLLED TYPE 2 DIABETES MELLITUS WITHOUT COMPLICATION, WITHOUT LONG-TERM CURRENT USE OF INSULIN (HCC): Primary | ICD-10-CM

## 2020-01-01 DIAGNOSIS — I10 ESSENTIAL HYPERTENSION: ICD-10-CM

## 2020-01-01 DIAGNOSIS — R05.9 COUGH: Primary | ICD-10-CM

## 2020-01-01 DIAGNOSIS — H25.13 AGE-RELATED NUCLEAR CATARACT OF BOTH EYES: ICD-10-CM

## 2020-01-01 DIAGNOSIS — M79.675 PAIN IN TOES OF BOTH FEET: ICD-10-CM

## 2020-01-01 PROCEDURE — 11721 DEBRIDE NAIL 6 OR MORE: CPT | Performed by: PODIATRIST

## 2020-01-01 PROCEDURE — 3078F DIAST BP <80 MM HG: CPT | Performed by: FAMILY MEDICINE

## 2020-01-01 PROCEDURE — 3075F SYST BP GE 130 - 139MM HG: CPT | Performed by: FAMILY MEDICINE

## 2020-01-01 PROCEDURE — 99213 OFFICE O/P EST LOW 20 MIN: CPT | Performed by: FAMILY MEDICINE

## 2020-01-01 PROCEDURE — 92014 COMPRE OPH EXAM EST PT 1/>: CPT | Performed by: OPTOMETRIST

## 2020-01-01 PROCEDURE — 3008F BODY MASS INDEX DOCD: CPT | Performed by: PODIATRIST

## 2020-01-01 RX ORDER — LOSARTAN POTASSIUM 25 MG/1
25 TABLET ORAL DAILY
Qty: 90 TABLET | Refills: 1 | Status: SHIPPED | OUTPATIENT
Start: 2020-01-01 | End: 2021-01-01

## 2020-01-01 RX ORDER — SIMVASTATIN 40 MG
TABLET ORAL
Qty: 90 TABLET | Refills: 1 | Status: SHIPPED | OUTPATIENT
Start: 2020-01-01 | End: 2021-01-01

## 2020-01-01 RX ORDER — LOSARTAN POTASSIUM 25 MG/1
TABLET ORAL
Qty: 30 TABLET | Refills: 0 | OUTPATIENT
Start: 2020-01-01

## 2020-01-01 RX ORDER — LOSARTAN POTASSIUM 25 MG/1
25 TABLET ORAL DAILY
Qty: 30 TABLET | Refills: 0 | Status: SHIPPED | OUTPATIENT
Start: 2020-01-01 | End: 2020-01-01

## 2020-01-01 RX ORDER — LISINOPRIL 5 MG/1
5 TABLET ORAL DAILY
Qty: 90 TABLET | Refills: 1 | Status: SHIPPED | OUTPATIENT
Start: 2020-01-01 | End: 2020-01-01 | Stop reason: ALTCHOICE

## 2020-01-07 ENCOUNTER — TELEPHONE (OUTPATIENT)
Dept: CASE MANAGEMENT | Age: 70
End: 2020-01-07

## 2020-01-07 NOTE — TELEPHONE ENCOUNTER
Pt is eligible for 2020  Medicare Annual Health Assessment visit. Left message to call back [phone].

## 2020-01-08 NOTE — TELEPHONE ENCOUNTER
Spouse returned call, informed patient is eligible for 2020 Medicare Annual Health Assessment visit, scheduled for 2/17/2020.

## 2020-01-29 RX ORDER — SIMVASTATIN 40 MG
TABLET ORAL
Qty: 90 TABLET | Refills: 1 | Status: SHIPPED | OUTPATIENT
Start: 2020-01-29 | End: 2020-01-01

## 2020-01-29 RX ORDER — HYDROCHLOROTHIAZIDE 25 MG/1
TABLET ORAL
Qty: 90 TABLET | Refills: 1 | Status: SHIPPED | OUTPATIENT
Start: 2020-01-29 | End: 2020-08-03

## 2020-01-29 NOTE — TELEPHONE ENCOUNTER
Refill passed per [facility] protocol.     Cholesterol Medications  Protocol Criteria:  · Appointment scheduled in the past 12 months or in the next 3 months  · ALT & LDL on file in the past 12 months  · ALT result < 80  · LDL result <130   Recent Outp Controlled type 2 diabetes mellitus without complication, without long-term current use of insulin [facility])    Abilio Mireles MD    Office Visit    5 months ago [address] [facility], [address] [place] [address], Tonio Vega MD    Office Visit    11 months ago Mike Lawrence welcome exam    Sasha Fofana MD    Office Visit        Future Appointments       Provide

## 2020-02-17 ENCOUNTER — APPOINTMENT (OUTPATIENT)
Dept: LAB | Age: 70
End: 2020-02-17
Attending: FAMILY MEDICINE
Payer: MEDICARE

## 2020-02-17 ENCOUNTER — OFFICE VISIT (OUTPATIENT)
Dept: FAMILY MEDICINE CLINIC | Facility: CLINIC | Age: 70
End: 2020-02-17
Payer: MEDICARE

## 2020-02-17 VITALS
TEMPERATURE: 98 F | HEART RATE: 99 BPM | DIASTOLIC BLOOD PRESSURE: 79 MMHG | BODY MASS INDEX: 30.32 KG/M2 | WEIGHT: 182 LBS | SYSTOLIC BLOOD PRESSURE: 146 MMHG | HEIGHT: 65 IN

## 2020-02-17 DIAGNOSIS — B35.1 ONYCHOMYCOSIS: ICD-10-CM

## 2020-02-17 DIAGNOSIS — I25.84 CORONARY ARTERY CALCIFICATION: ICD-10-CM

## 2020-02-17 DIAGNOSIS — H25.13 AGE-RELATED NUCLEAR CATARACT OF BOTH EYES: ICD-10-CM

## 2020-02-17 DIAGNOSIS — I10 ESSENTIAL HYPERTENSION: ICD-10-CM

## 2020-02-17 DIAGNOSIS — M85.89 OSTEOPENIA OF MULTIPLE SITES: ICD-10-CM

## 2020-02-17 DIAGNOSIS — D12.6 TUBULAR ADENOMA OF COLON: ICD-10-CM

## 2020-02-17 DIAGNOSIS — I25.10 CORONARY ARTERY CALCIFICATION: ICD-10-CM

## 2020-02-17 DIAGNOSIS — J43.9 PULMONARY EMPHYSEMA, UNSPECIFIED EMPHYSEMA TYPE (HCC): ICD-10-CM

## 2020-02-17 DIAGNOSIS — Z00.00 ENCOUNTER FOR ANNUAL HEALTH EXAMINATION: ICD-10-CM

## 2020-02-17 DIAGNOSIS — E11.9 CONTROLLED TYPE 2 DIABETES MELLITUS WITHOUT COMPLICATION, WITHOUT LONG-TERM CURRENT USE OF INSULIN (HCC): ICD-10-CM

## 2020-02-17 DIAGNOSIS — G40.909 SEIZURE DISORDER (HCC): Primary | ICD-10-CM

## 2020-02-17 DIAGNOSIS — E78.00 HYPERCHOLESTEREMIA: ICD-10-CM

## 2020-02-17 DIAGNOSIS — Z87.891 PERSONAL HISTORY OF TOBACCO USE: ICD-10-CM

## 2020-02-17 DIAGNOSIS — M15.9 PRIMARY OSTEOARTHRITIS INVOLVING MULTIPLE JOINTS: ICD-10-CM

## 2020-02-17 DIAGNOSIS — Z98.890 S/P COLONOSCOPIC POLYPECTOMY: ICD-10-CM

## 2020-02-17 DIAGNOSIS — N40.0 BENIGN NON-NODULAR PROSTATIC HYPERPLASIA WITHOUT LOWER URINARY TRACT SYMPTOMS: ICD-10-CM

## 2020-02-17 DIAGNOSIS — Z87.891 PERSONAL HISTORY OF TOBACCO USE, PRESENTING HAZARDS TO HEALTH: ICD-10-CM

## 2020-02-17 DIAGNOSIS — I70.0 ATHEROSCLEROSIS OF AORTA (HCC): ICD-10-CM

## 2020-02-17 DIAGNOSIS — E78.00 HYPERCHOLESTEREMIA: Primary | ICD-10-CM

## 2020-02-17 DIAGNOSIS — I51.89 GRADE I DIASTOLIC DYSFUNCTION: ICD-10-CM

## 2020-02-17 DIAGNOSIS — Z87.891 HX OF TOBACCO USE, PRESENTING HAZARDS TO HEALTH: ICD-10-CM

## 2020-02-17 DIAGNOSIS — R94.31 ABNORMAL EKG: ICD-10-CM

## 2020-02-17 PROBLEM — R31.29 MICROHEMATURIA: Status: RESOLVED | Noted: 2018-02-13 | Resolved: 2020-02-17

## 2020-02-17 PROBLEM — K64.8 INTERNAL HEMORRHOIDS: Status: RESOLVED | Noted: 2017-11-30 | Resolved: 2020-02-17

## 2020-02-17 PROBLEM — K63.5 COLON POLYPS: Status: RESOLVED | Noted: 2019-01-03 | Resolved: 2020-02-17

## 2020-02-17 LAB
ALBUMIN SERPL-MCNC: 3.7 G/DL (ref 3.4–5)
ALBUMIN/GLOB SERPL: 0.8 {RATIO} (ref 1–2)
ALP LIVER SERPL-CCNC: 86 U/L (ref 45–117)
ALT SERPL-CCNC: 32 U/L (ref 16–61)
ANION GAP SERPL CALC-SCNC: 4 MMOL/L (ref 0–18)
AST SERPL-CCNC: 17 U/L (ref 15–37)
BILIRUB SERPL-MCNC: 0.3 MG/DL (ref 0.1–2)
BUN BLD-MCNC: 17 MG/DL (ref 7–18)
BUN/CREAT SERPL: 23.3 (ref 10–20)
CALCIUM BLD-MCNC: 9.4 MG/DL (ref 8.5–10.1)
CHLORIDE SERPL-SCNC: 104 MMOL/L (ref 98–112)
CO2 SERPL-SCNC: 31 MMOL/L (ref 21–32)
CREAT BLD-MCNC: 0.73 MG/DL (ref 0.7–1.3)
EST. AVERAGE GLUCOSE BLD GHB EST-MCNC: 134 MG/DL (ref 68–126)
GLOBULIN PLAS-MCNC: 4.5 G/DL (ref 2.8–4.4)
GLUCOSE BLD-MCNC: 73 MG/DL (ref 70–99)
HBA1C MFR BLD HPLC: 6.3 % (ref ?–5.7)
M PROTEIN MFR SERPL ELPH: 8.2 G/DL (ref 6.4–8.2)
OSMOLALITY SERPL CALC.SUM OF ELEC: 288 MOSM/KG (ref 275–295)
PATIENT FASTING Y/N/NP: NO
POTASSIUM SERPL-SCNC: 4.4 MMOL/L (ref 3.5–5.1)
SODIUM SERPL-SCNC: 139 MMOL/L (ref 136–145)

## 2020-02-17 PROCEDURE — 36415 COLL VENOUS BLD VENIPUNCTURE: CPT

## 2020-02-17 PROCEDURE — 99397 PER PM REEVAL EST PAT 65+ YR: CPT | Performed by: FAMILY MEDICINE

## 2020-02-17 PROCEDURE — 83036 HEMOGLOBIN GLYCOSYLATED A1C: CPT

## 2020-02-17 PROCEDURE — G0439 PPPS, SUBSEQ VISIT: HCPCS | Performed by: FAMILY MEDICINE

## 2020-02-17 PROCEDURE — 93010 ELECTROCARDIOGRAM REPORT: CPT | Performed by: FAMILY MEDICINE

## 2020-02-17 PROCEDURE — 96160 PT-FOCUSED HLTH RISK ASSMT: CPT | Performed by: FAMILY MEDICINE

## 2020-02-17 PROCEDURE — 93005 ELECTROCARDIOGRAM TRACING: CPT

## 2020-02-17 PROCEDURE — 80053 COMPREHEN METABOLIC PANEL: CPT

## 2020-02-17 NOTE — PROGRESS NOTES
HPI:   July Arana is a 68year old male who presents for a Medicare Subsequent Annual Wellness visit (Pt already had Initial Annual Wellness).     Still smoking 4-5 cigs  A day  Has tried nicorette with out success  Declines meds for smoking cess Tob Hx section to julia Tobacco Cessation counseling given as YES and refresh this link)        Mr. Gaby Kapadia already takes aspirin and has it on his medication list.   CAGE Alcohol screening   Akbar Murdock was screened for Alcohol abuse and had a sc HYDROCHLOROTHIAZIDE 25 MG Oral Tab, TAKE 1 TABLET EVERY MORNING WITH A BANANA OR ORANGE JUICE  METFORMIN  MG Oral Tab, TAKE 1 TABLET DAILY WITH BREAKFAST.   SIMVASTATIN 40 MG Oral Tab, TAKE 1 TABLET EVERY NIGHT  Calcium-Phosphorus-Vitamin D (CALCIU problem hearing over the telephone:  No I have trouble following the conversations when two or more people are talking at the same time:  Sometimes   I have trouble understanding things on the TV:  No I have to strain to understand conversations:  No   I h all four quadrants,  no masses, no organomegaly   Genitalia: normal   Rectal: normal   Extremities: Extremities normal, atraumatic, no cyanosis or edema   Pulses: 2+ and symmetric   Skin: Skin color, texture, turgor normal, dry skin    Lymph nodes: Cervica Future    Grade I diastolic dysfunction    Osteopenia of multiple sites       1.  Personal history of tobacco use, presenting hazards to health  Continues to smoke but less he states  Vague about how much he smokes  - [place] 3 UP TO 8 M ordered. Return in 3 months (on 5/17/2020) for Chronic problems. Darlyn Nix MD, 2/17/2020     General Health     In the past six months, have you lost more than 10 pounds without trying?: 2 - No  Has your appetite been poor?: No  How does the pat Pneumococcal 23 (Pneumovax)  Covered Once after 65 06/27/2016 Please get once after your 65th birthday    Hepatitis B for Moderate/High Risk No vaccine history found Medium/high risk factors:   End-stage renal disease   Hemophiliacs who received Factor VII

## 2020-02-17 NOTE — PATIENT INSTRUCTIONS
Suzanna Crater SCREENING SCHEDULE   Tests on this list are recommended by your physician but may not be covered, or covered at this frequency, by your insurer. Please check with your insurance carrier before scheduling to verify coverage.     Linda Shore years- more often if abnormal Colonoscopy due on 01/03/2024 Update Health Maintenance if applicable    Flex Sigmoidoscopy Screen  Covered every 5 years No results found for this or any previous visit. No flowsheet data found.      Fecal Occult Blood   Cover Medicare Part B) No orders found for this or any previous visit.  This may be covered with your prescription benefits, but Medicare does not cover unless Medically needed    Zoster (Not covered by Medicare Part B) No orders found for this or any previous vi

## 2020-02-25 ENCOUNTER — HOSPITAL ENCOUNTER (OUTPATIENT)
Dept: CV DIAGNOSTICS | Facility: HOSPITAL | Age: 70
Discharge: HOME | End: 2020-02-25
Attending: FAMILY MEDICINE
Payer: MEDICARE

## 2020-02-25 ENCOUNTER — HOSPITAL ENCOUNTER (OUTPATIENT)
Dept: NUCLEAR MEDICINE | Facility: HOSPITAL | Age: 70
Discharge: HOME | End: 2020-02-25
Attending: FAMILY MEDICINE
Payer: MEDICARE

## 2020-02-25 DIAGNOSIS — I25.10 CORONARY ARTERY CALCIFICATION: ICD-10-CM

## 2020-02-25 DIAGNOSIS — E78.00 HYPERCHOLESTEREMIA: ICD-10-CM

## 2020-02-25 DIAGNOSIS — E11.9 CONTROLLED TYPE 2 DIABETES MELLITUS WITHOUT COMPLICATION, WITHOUT LONG-TERM CURRENT USE OF INSULIN (HCC): ICD-10-CM

## 2020-02-25 DIAGNOSIS — R94.31 ABNORMAL EKG: ICD-10-CM

## 2020-02-25 DIAGNOSIS — I25.84 CORONARY ARTERY CALCIFICATION: ICD-10-CM

## 2020-02-25 DIAGNOSIS — I10 ESSENTIAL HYPERTENSION: ICD-10-CM

## 2020-02-25 PROCEDURE — 93018 CV STRESS TEST I&R ONLY: CPT | Performed by: FAMILY MEDICINE

## 2020-02-25 PROCEDURE — 78452 HT MUSCLE IMAGE SPECT MULT: CPT | Performed by: FAMILY MEDICINE

## 2020-02-25 PROCEDURE — 93017 CV STRESS TEST TRACING ONLY: CPT | Performed by: FAMILY MEDICINE

## 2020-02-25 PROCEDURE — 93016 CV STRESS TEST SUPVJ ONLY: CPT | Performed by: FAMILY MEDICINE

## 2020-03-15 RX ORDER — CARBAMAZEPINE 200 MG/1
TABLET ORAL
Qty: 180 TABLET | Refills: 3 | Status: SHIPPED | OUTPATIENT
Start: 2020-03-15 | End: 2020-08-04

## 2020-03-15 NOTE — TELEPHONE ENCOUNTER
Review pended refill request as it does not fall under a protocol.  Please note there are 2 doses on the med list, unsure if pt takes both    Last Rx: 2/14/19  LOV: 3 weeks ago      Requested Prescriptions     Pending Prescriptions Disp Refills   • CARBAMAZ

## 2020-05-26 ENCOUNTER — TELEPHONE (OUTPATIENT)
Dept: NEUROLOGY | Facility: CLINIC | Age: 70
End: 2020-05-26

## 2020-05-26 RX ORDER — DIVALPROEX SODIUM 500 MG/1
TABLET, DELAYED RELEASE ORAL
Qty: 180 TABLET | Refills: 0 | Status: SHIPPED | OUTPATIENT
Start: 2020-05-26 | End: 2020-07-20

## 2020-05-26 NOTE — TELEPHONE ENCOUNTER
Called spoke with pt in regards to message, notified pt will need to contact neurologist dr Fish Haro for refill on medication, pt states verbal understanding

## 2020-05-29 NOTE — TELEPHONE ENCOUNTER
Called Fatoumata Landry at [address] [place] [address] Dr janay Jett which was sent to pharmacy by Dr. Shaggy Avila 5/26/20. Per [employer], medication was sent out to pt today and should be delivered next week.      It appears Depakote has been prescribed by Dr. Shaggy Avila has prescribed medi

## 2020-07-10 RX ORDER — CARBAMAZEPINE 100 MG/1
TABLET, CHEWABLE ORAL
Qty: 90 TABLET | Refills: 3 | Status: SHIPPED | OUTPATIENT
Start: 2020-07-10 | End: 2020-08-04

## 2020-07-20 RX ORDER — DIVALPROEX SODIUM 500 MG/1
TABLET, DELAYED RELEASE ORAL
Qty: 180 TABLET | Refills: 0 | Status: SHIPPED | OUTPATIENT
Start: 2020-07-20 | End: 2020-08-04

## 2020-08-03 RX ORDER — HYDROCHLOROTHIAZIDE 25 MG/1
TABLET ORAL
Qty: 90 TABLET | Refills: 1 | Status: SHIPPED | OUTPATIENT
Start: 2020-08-03 | End: 2021-01-01

## 2020-08-04 ENCOUNTER — OFFICE VISIT (OUTPATIENT)
Dept: NEUROLOGY | Facility: CLINIC | Age: 70
End: 2020-08-04
Payer: MEDICARE

## 2020-08-04 VITALS — SYSTOLIC BLOOD PRESSURE: 136 MMHG | HEART RATE: 90 BPM | RESPIRATION RATE: 15 BRPM | DIASTOLIC BLOOD PRESSURE: 64 MMHG

## 2020-08-04 DIAGNOSIS — R56.9 SEIZURE (HCC): Primary | ICD-10-CM

## 2020-08-04 PROCEDURE — 3075F SYST BP GE 130 - 139MM HG: CPT | Performed by: OTHER

## 2020-08-04 PROCEDURE — 3078F DIAST BP <80 MM HG: CPT | Performed by: OTHER

## 2020-08-04 PROCEDURE — 99213 OFFICE O/P EST LOW 20 MIN: CPT | Performed by: OTHER

## 2020-08-04 RX ORDER — CARBAMAZEPINE 100 MG/1
100 TABLET, CHEWABLE ORAL DAILY
Qty: 90 TABLET | Refills: 3 | Status: SHIPPED | OUTPATIENT
Start: 2020-08-04 | End: 2021-01-01

## 2020-08-04 RX ORDER — CARBAMAZEPINE 200 MG/1
200 TABLET ORAL 2 TIMES DAILY
Qty: 180 TABLET | Refills: 3 | Status: SHIPPED | OUTPATIENT
Start: 2020-08-04 | End: 2021-01-01

## 2020-08-04 RX ORDER — DIVALPROEX SODIUM 500 MG/1
500 TABLET, DELAYED RELEASE ORAL 2 TIMES DAILY
Qty: 180 TABLET | Refills: 3 | Status: SHIPPED | OUTPATIENT
Start: 2020-08-04 | End: 2021-01-01

## 2020-08-04 NOTE — PROGRESS NOTES
Mr Pablito Zamorano, was in the office with his wife. Not been in the office over 2 years. No seizures. He denies headache, neck pain, low back pain. No focal motor, sensory symptoms in the arms or legs. Epic records reviewed. Labs reviewed.   Suggest he

## 2020-09-15 ENCOUNTER — OFFICE VISIT (OUTPATIENT)
Dept: FAMILY MEDICINE CLINIC | Facility: CLINIC | Age: 70
End: 2020-09-15
Payer: MEDICARE

## 2020-09-15 VITALS
DIASTOLIC BLOOD PRESSURE: 76 MMHG | OXYGEN SATURATION: 96 % | HEART RATE: 102 BPM | TEMPERATURE: 97 F | SYSTOLIC BLOOD PRESSURE: 145 MMHG | BODY MASS INDEX: 30.99 KG/M2 | WEIGHT: 186 LBS | HEIGHT: 65 IN

## 2020-09-15 DIAGNOSIS — D12.6 TUBULAR ADENOMA OF COLON: ICD-10-CM

## 2020-09-15 DIAGNOSIS — J43.9 PULMONARY EMPHYSEMA, UNSPECIFIED EMPHYSEMA TYPE (HCC): ICD-10-CM

## 2020-09-15 DIAGNOSIS — I70.0 ATHEROSCLEROSIS OF AORTA (HCC): ICD-10-CM

## 2020-09-15 DIAGNOSIS — G40.909 SEIZURE DISORDER (HCC): ICD-10-CM

## 2020-09-15 DIAGNOSIS — I25.84 CORONARY ARTERY CALCIFICATION: ICD-10-CM

## 2020-09-15 DIAGNOSIS — N40.0 BENIGN NON-NODULAR PROSTATIC HYPERPLASIA WITHOUT LOWER URINARY TRACT SYMPTOMS: ICD-10-CM

## 2020-09-15 DIAGNOSIS — Z12.5 PROSTATE CANCER SCREENING: ICD-10-CM

## 2020-09-15 DIAGNOSIS — Z87.891 PERSONAL HISTORY OF TOBACCO USE: ICD-10-CM

## 2020-09-15 DIAGNOSIS — Z00.00 ENCOUNTER FOR ANNUAL HEALTH EXAMINATION: Primary | ICD-10-CM

## 2020-09-15 DIAGNOSIS — E78.00 HYPERCHOLESTEREMIA: ICD-10-CM

## 2020-09-15 DIAGNOSIS — I25.10 CORONARY ARTERY CALCIFICATION: ICD-10-CM

## 2020-09-15 DIAGNOSIS — E11.9 CONTROLLED TYPE 2 DIABETES MELLITUS WITHOUT COMPLICATION, WITHOUT LONG-TERM CURRENT USE OF INSULIN (HCC): ICD-10-CM

## 2020-09-15 DIAGNOSIS — I10 ESSENTIAL HYPERTENSION: ICD-10-CM

## 2020-09-15 DIAGNOSIS — B35.1 ONYCHOMYCOSIS: ICD-10-CM

## 2020-09-15 DIAGNOSIS — M85.80 OSTEOPENIA, UNSPECIFIED LOCATION: ICD-10-CM

## 2020-09-15 PROBLEM — I11.0 HYPERTENSIVE HEART DISEASE WITH HEART FAILURE (HCC): Status: ACTIVE | Noted: 2020-09-15

## 2020-09-15 PROBLEM — Z79.899 ENCOUNTER FOR LONG-TERM (CURRENT) USE OF HIGH-RISK MEDICATION: Status: RESOLVED | Noted: 2018-04-04 | Resolved: 2020-09-15

## 2020-09-15 PROBLEM — Z98.890 S/P COLONOSCOPIC POLYPECTOMY: Status: RESOLVED | Noted: 2017-11-30 | Resolved: 2020-09-15

## 2020-09-15 PROCEDURE — 3077F SYST BP >= 140 MM HG: CPT | Performed by: FAMILY MEDICINE

## 2020-09-15 PROCEDURE — G0008 ADMIN INFLUENZA VIRUS VAC: HCPCS | Performed by: FAMILY MEDICINE

## 2020-09-15 PROCEDURE — 99406 BEHAV CHNG SMOKING 3-10 MIN: CPT | Performed by: FAMILY MEDICINE

## 2020-09-15 PROCEDURE — 90662 IIV NO PRSV INCREASED AG IM: CPT | Performed by: FAMILY MEDICINE

## 2020-09-15 PROCEDURE — 3008F BODY MASS INDEX DOCD: CPT | Performed by: FAMILY MEDICINE

## 2020-09-15 PROCEDURE — 99214 OFFICE O/P EST MOD 30 MIN: CPT | Performed by: FAMILY MEDICINE

## 2020-09-15 PROCEDURE — 3078F DIAST BP <80 MM HG: CPT | Performed by: FAMILY MEDICINE

## 2020-09-15 RX ORDER — LISINOPRIL 5 MG/1
5 TABLET ORAL DAILY
Qty: 90 TABLET | Refills: 0 | Status: SHIPPED | OUTPATIENT
Start: 2020-09-15 | End: 2020-01-01

## 2020-09-15 NOTE — PROGRESS NOTES
HPI:   Alli Garcia is a 65year old male who presents for a MA (Medicare Advantage) [address] [facility] (Once per calendar year).     Still smoking 4-5 cigs a day  Not ready to quit   Feels good   Annual Physical due on 02/17/2021        Fall/Risk Assessme Consulting Physician (NEUROLOGY)    Patient Active Problem List:     Seizure disorder ([facility] [address] [facility]     Overweight (BMI 25.0-29. 9)     Hypercholesteremia     Coronary artery calcification     Pulmonary emphysema (HCC)     Atherosclerosis of aorta (HCC)     Benign TAKE 1 TABLET EVERY DAY WITH BREAKFAST  TRIAMCINOLONE ACETONIDE 0.1 % External Cream, APPLY TOPICALLY TWICE DAILY AS NEEDED  SIMVASTATIN 40 MG Oral Tab, TAKE 1 TABLET EVERY NIGHT  Calcium-Phosphorus-Vitamin D (CALCIUM/VITAMIN D3/ADULT GUMMY OR), Take by mo or doorbell:  No I have trouble hearing conversations in a noisy background such as a crowded room or restaurant:  No   I get confused about where sounds come from:  No I misunderstand some words in a sentence and need to ask people to repeat themselves: texture, turgor normal, no rashes or lesions   Lymph nodes: Cervical, supraclavicular, and axillary nodes normal   Neurologic: Normal            Vaccination History     Immunization History   Administered Date(s) Administered   • FLU VAC High Dose 65 YRS & Offered other options including acupuncture and hypnosis where he can get a consultation with integrative medicine department. Patient is not sure about this.  - BEHAV CHNG SMOKING GR THAN 3 UP TO 10 MIN    2.  Encounter for annual health examination ordered. Consult ordered. Continue with current treatment plan. Return in about 1 month (around 10/15/2020) for bp . Darlyn Nix MD, 9/15/2020     General Health          This section provided for quick review of chart, separate sheet to patient the mentally retarded   Persons who live in the same house as a HepB virus carrier   Homosexual men   Illicit injectable drug abusers     Tetanus Toxoid  Only covered with a cut with metal- TD and TDaP Not covered by Medicare Part B) No vaccine history fou readiness to quit tobacco.  Advise: We gave clear, specific, and personalized behavior change advice, including information about personal health harms and benefits.  Specifically, he was told that Quitting tobacco is one of the best things he can do for hi

## 2020-09-15 NOTE — PATIENT INSTRUCTIONS
[facility] [place] SCREENING SCHEDULE   Tests on this list are recommended by your physician but may not be covered, or covered at this frequency, by your insurer. Please check with your insurance carrier before scheduling to verify coverage.     Harris Leach 10 years- more often if abnormal Colonoscopy due on 01/03/2024 Update [facility] if applicable    Flex Sigmoidoscopy Screen  Covered every 5 years No results found for this or any previous visit. No flowsheet data found.      Fecal Occult Blood   Co abusers     Tetanus Toxoid- Only covered with a cut with metal- TD and TDaP Not covered by Medicare Part B) No orders found for this or any previous visit.  This may be covered with your prescription benefits, but Medicare does not cover unless Medically ne

## 2020-09-24 ENCOUNTER — LAB ENCOUNTER (OUTPATIENT)
Dept: LAB | Facility: REFERENCE LAB | Age: 70
End: 2020-09-24
Attending: FAMILY MEDICINE
Payer: MEDICARE

## 2020-09-24 ENCOUNTER — HOSPITAL ENCOUNTER (OUTPATIENT)
Dept: CT IMAGING | Facility: HOSPITAL | Age: 70
Discharge: HOME | End: 2020-09-24
Attending: FAMILY MEDICINE
Payer: MEDICARE

## 2020-09-24 DIAGNOSIS — E78.00 HYPERCHOLESTEREMIA: ICD-10-CM

## 2020-09-24 DIAGNOSIS — Z12.5 PROSTATE CANCER SCREENING: ICD-10-CM

## 2020-09-24 DIAGNOSIS — E11.9 CONTROLLED TYPE 2 DIABETES MELLITUS WITHOUT COMPLICATION, WITHOUT LONG-TERM CURRENT USE OF INSULIN (HCC): ICD-10-CM

## 2020-09-24 DIAGNOSIS — Z87.891 PERSONAL HISTORY OF TOBACCO USE, PRESENTING HAZARDS TO HEALTH: ICD-10-CM

## 2020-09-24 DIAGNOSIS — G40.909 SEIZURE DISORDER (HCC): ICD-10-CM

## 2020-09-24 LAB
ALBUMIN SERPL-MCNC: 3.4 G/DL (ref 3.4–5)
ALBUMIN/GLOB SERPL: 0.7 {RATIO} (ref 1–2)
ALP LIVER SERPL-CCNC: 80 U/L
ALT SERPL-CCNC: 34 U/L
ANION GAP SERPL CALC-SCNC: 5 MMOL/L (ref 0–18)
AST SERPL-CCNC: 20 U/L (ref 15–37)
BASOPHILS # BLD AUTO: 0.05 X10(3) UL (ref 0–0.2)
BASOPHILS NFR BLD AUTO: 0.7 %
BILIRUB SERPL-MCNC: 0.3 MG/DL (ref 0.1–2)
BUN BLD-MCNC: 18 MG/DL (ref 7–18)
BUN/CREAT SERPL: 23.7 (ref 10–20)
CALCIUM BLD-MCNC: 9.1 MG/DL (ref 8.5–10.1)
CHLORIDE SERPL-SCNC: 105 MMOL/L (ref 98–112)
CHOLEST SMN-MCNC: 200 MG/DL (ref ?–200)
CO2 SERPL-SCNC: 30 MMOL/L (ref 21–32)
COMPLEXED PSA SERPL-MCNC: 2.25 NG/ML (ref ?–4)
CREAT BLD-MCNC: 0.76 MG/DL
DEPRECATED RDW RBC AUTO: 49.1 FL (ref 35.1–46.3)
EOSINOPHIL # BLD AUTO: 0.33 X10(3) UL (ref 0–0.7)
EOSINOPHIL NFR BLD AUTO: 4.5 %
ERYTHROCYTE [DISTWIDTH] IN BLOOD BY AUTOMATED COUNT: 14.1 % (ref 11–15)
EST. AVERAGE GLUCOSE BLD GHB EST-MCNC: 137 MG/DL (ref 68–126)
GLOBULIN PLAS-MCNC: 4.6 G/DL (ref 2.8–4.4)
GLUCOSE BLD-MCNC: 100 MG/DL (ref 70–99)
HBA1C MFR BLD HPLC: 6.4 % (ref ?–5.7)
HCT VFR BLD AUTO: 41.4 %
HDLC SERPL-MCNC: 81 MG/DL (ref 40–59)
HGB BLD-MCNC: 14 G/DL
IMM GRANULOCYTES # BLD AUTO: 0.01 X10(3) UL (ref 0–1)
IMM GRANULOCYTES NFR BLD: 0.1 %
LDLC SERPL CALC-MCNC: 97 MG/DL (ref ?–100)
LYMPHOCYTES # BLD AUTO: 2.7 X10(3) UL (ref 1–4)
LYMPHOCYTES NFR BLD AUTO: 37 %
M PROTEIN MFR SERPL ELPH: 8 G/DL (ref 6.4–8.2)
MCH RBC QN AUTO: 32 PG (ref 26–34)
MCHC RBC AUTO-ENTMCNC: 33.8 G/DL (ref 31–37)
MCV RBC AUTO: 94.5 FL
MONOCYTES # BLD AUTO: 0.65 X10(3) UL (ref 0.1–1)
MONOCYTES NFR BLD AUTO: 8.9 %
NEUTROPHILS # BLD AUTO: 3.56 X10 (3) UL (ref 1.5–7.7)
NEUTROPHILS # BLD AUTO: 3.56 X10(3) UL (ref 1.5–7.7)
NEUTROPHILS NFR BLD AUTO: 48.8 %
NONHDLC SERPL-MCNC: 119 MG/DL (ref ?–130)
OSMOLALITY SERPL CALC.SUM OF ELEC: 292 MOSM/KG (ref 275–295)
PATIENT FASTING Y/N/NP: YES
PATIENT FASTING Y/N/NP: YES
PLATELET # BLD AUTO: 200 10(3)UL (ref 150–450)
POTASSIUM SERPL-SCNC: 3.8 MMOL/L (ref 3.5–5.1)
RBC # BLD AUTO: 4.38 X10(6)UL
SODIUM SERPL-SCNC: 140 MMOL/L (ref 136–145)
TRIGL SERPL-MCNC: 110 MG/DL (ref 30–149)
VLDLC SERPL CALC-MCNC: 22 MG/DL (ref 0–30)
WBC # BLD AUTO: 7.3 X10(3) UL (ref 4–11)

## 2020-09-24 PROCEDURE — 83036 HEMOGLOBIN GLYCOSYLATED A1C: CPT

## 2020-09-24 PROCEDURE — 80061 LIPID PANEL: CPT

## 2020-09-24 PROCEDURE — 85025 COMPLETE CBC W/AUTO DIFF WBC: CPT

## 2020-09-24 PROCEDURE — 36415 COLL VENOUS BLD VENIPUNCTURE: CPT

## 2020-09-24 PROCEDURE — 80053 COMPREHEN METABOLIC PANEL: CPT

## 2020-12-04 ENCOUNTER — TELEPHONE (OUTPATIENT)
Dept: FAMILY MEDICINE CLINIC | Facility: CLINIC | Age: 70
End: 2020-12-04

## 2020-12-04 NOTE — TELEPHONE ENCOUNTER
Spoke to patient's spouse and for one month patient has been having a dry cough. Patient started taking Lisinopril 5mg daily in September. No other symptoms. Please advise.

## 2020-12-04 NOTE — TELEPHONE ENCOUNTER
Patient's wife called and advised since patient has been on the medication Listed below the patient has had a dry cough. They would like to speak with someone about this to see if this is normal and to see if there is a way to prevent it.       Please Advis

## 2020-12-07 DIAGNOSIS — I10 ESSENTIAL HYPERTENSION: ICD-10-CM

## 2020-12-09 NOTE — PROGRESS NOTES
This visit is conducted using Telemedicine with live, interactive video and audio during this Coronavirus pandemic. Please note that the following visit was completed using two-way, real-time interactive audio and/or video communication.   This has been telehealth consent form, related consents and the risks discussed. Lastly, the patient confirmed that they were in [place]. Included in this visit, time may have been spent reviewing labs, medications, radiology tests and decision making.  Appropriate Osteopenia      Past Medical History:   Diagnosis Date   • Colon polyps 1/3/2019   • Hyperlipidemia    • Internal hemorrhoids 11/30/2017   • Pulmonary emphysema ([facility]    • S/P colonoscopic polypectomy 11/30/2017   • Seizure disorder ([facility]     2014   • Heidy Leal increased work of breathing and very coherent and alert on the phone. Throat/Neck: normal sound to voice. Respiratory:  non-labored. no increased work of breathing.     Skin:  normal moisture and skin texture, normal color of skin, no jaundice  Hematolog

## 2020-12-18 NOTE — PROGRESS NOTES
Jailyn Ashley is a 65year old male.     HPI:     HPI     Diabetic Eye Exam     Diabetes Type: Type 2, controlled with diet and taking oral medications    Duration: 1 year    Number of years diabetic: 1    Number of years on pills: 1    Number of year mouth daily. 30 tablet 0   • SIMVASTATIN 40 MG Oral Tab TAKE 1 TABLET EVERY NIGHT 90 tablet 1   • divalproex Sodium 500 MG Oral Tab EC DR tab Take 1 tablet (500 mg total) by mouth 2 (two) times daily.  180 tablet 3   • carBAMazepine 100 MG Oral Chew Tab Ruby Slit Lamp and Fundus Exam     External Exam       Right Left    External Normal Normal          Slit Lamp Exam       Right Left    Lids/Lashes Meibomian gland dysfunction Meibomian gland dysfunction    Conjunctiva/Sclera Nasal/temp pinguecula, Pigmentati (around 12/18/2021) for Diabetic Eye exam.    12/18/2020  Scribed by: Judee Bence

## 2020-12-18 NOTE — PROGRESS NOTES
Ovi Cortes is a 65year old male.     HPI:     HPI     Diabetic Eye Exam     Diabetes Type: Type 2, controlled with diet and taking oral medications    Duration: 1 year    Number of years diabetic: 1    Number of years on pills: 1    Number of year mouth daily. 30 tablet 0   • SIMVASTATIN 40 MG Oral Tab TAKE 1 TABLET EVERY NIGHT 90 tablet 1   • divalproex Sodium 500 MG Oral Tab EC DR tab Take 1 tablet (500 mg total) by mouth 2 (two) times daily.  180 tablet 3   • carBAMazepine 100 MG Oral Chew Tab Ruby Lamp and Fundus Exam     External Exam       Right Left    External Normal Normal          Slit Lamp Exam       Right Left    Lids/Lashes Meibomian gland dysfunction Meibomian gland dysfunction    Conjunctiva/Sclera Nasal/temp pinguecula, Pigmentation Nasa 12/18/2021) for Diabetic Eye exam.    12/18/2020  Scribed by: Sarahi Colin

## 2020-12-18 NOTE — PROGRESS NOTES
HPI:    Patient ID: Marco Cruz is a 65year old male.   This pleasant 70-year-old diabetic presents to the office today and states that he is here because of a need for a yearly diabetic foot exam but his wife states that he is having pain with his consistent with chronic longstanding mycosis. There is thickness, subungual debris separation and change. There is tenderness on palpation but there is no sign of infection. Hygiene is acceptable his shoes are appropriate.   I discussed the care of patricia

## 2021-01-01 ENCOUNTER — OFFICE VISIT (OUTPATIENT)
Dept: SURGERY | Facility: CLINIC | Age: 71
End: 2021-01-01
Payer: MEDICARE

## 2021-01-01 ENCOUNTER — HOSPITAL ENCOUNTER (OUTPATIENT)
Dept: GENERAL RADIOLOGY | Age: 71
Discharge: HOME | End: 2021-01-01
Attending: FAMILY MEDICINE
Payer: MEDICARE

## 2021-01-01 ENCOUNTER — IMMUNIZATION (OUTPATIENT)
Dept: LAB | Age: 71
End: 2021-01-01
Attending: HOSPITALIST
Payer: MEDICARE

## 2021-01-01 ENCOUNTER — TELEPHONE (OUTPATIENT)
Dept: FAMILY MEDICINE CLINIC | Facility: CLINIC | Age: 71
End: 2021-01-01

## 2021-01-01 ENCOUNTER — APPOINTMENT (OUTPATIENT)
Dept: INTERVENTIONAL RADIOLOGY/VASCULAR | Facility: HOSPITAL | Age: 71
DRG: 180 | End: 2021-01-01
Attending: INTERNAL MEDICINE
Payer: MEDICARE

## 2021-01-01 ENCOUNTER — HOSPITAL ENCOUNTER (OUTPATIENT)
Age: 71
Discharge: ANOTHER HOSPITAL | End: 2021-01-01
Attending: EMERGENCY MEDICINE
Payer: MEDICARE

## 2021-01-01 ENCOUNTER — LAB ENCOUNTER (OUTPATIENT)
Dept: LAB | Age: 71
End: 2021-01-01
Attending: FAMILY MEDICINE
Payer: MEDICARE

## 2021-01-01 ENCOUNTER — HOSPITAL ENCOUNTER (OUTPATIENT)
Dept: BONE DENSITY | Age: 71
Discharge: HOME | End: 2021-01-01
Attending: FAMILY MEDICINE
Payer: MEDICARE

## 2021-01-01 ENCOUNTER — APPOINTMENT (OUTPATIENT)
Dept: CV DIAGNOSTICS | Facility: HOSPITAL | Age: 71
DRG: 180 | End: 2021-01-01
Attending: INTERNAL MEDICINE
Payer: MEDICARE

## 2021-01-01 ENCOUNTER — PATIENT OUTREACH (OUTPATIENT)
Dept: CASE MANAGEMENT | Age: 71
End: 2021-01-01

## 2021-01-01 ENCOUNTER — OFFICE VISIT (OUTPATIENT)
Dept: NEUROLOGY | Facility: CLINIC | Age: 71
End: 2021-01-01
Payer: MEDICARE

## 2021-01-01 ENCOUNTER — OFFICE VISIT (OUTPATIENT)
Dept: FAMILY MEDICINE CLINIC | Facility: CLINIC | Age: 71
End: 2021-01-01
Payer: MEDICARE

## 2021-01-01 ENCOUNTER — HOSPITAL ENCOUNTER (EMERGENCY)
Facility: HOSPITAL | Age: 71
Discharge: HOME | End: 2021-01-01
Attending: EMERGENCY MEDICINE
Payer: MEDICARE

## 2021-01-01 ENCOUNTER — APPOINTMENT (OUTPATIENT)
Dept: GENERAL RADIOLOGY | Facility: HOSPITAL | Age: 71
DRG: 180 | End: 2021-01-01
Attending: EMERGENCY MEDICINE
Payer: MEDICARE

## 2021-01-01 ENCOUNTER — APPOINTMENT (OUTPATIENT)
Dept: GENERAL RADIOLOGY | Facility: HOSPITAL | Age: 71
DRG: 180 | End: 2021-01-01
Attending: INTERNAL MEDICINE
Payer: MEDICARE

## 2021-01-01 ENCOUNTER — TELEPHONE (OUTPATIENT)
Dept: PHYSICAL MEDICINE AND REHAB | Facility: CLINIC | Age: 71
End: 2021-01-01

## 2021-01-01 ENCOUNTER — LAB ENCOUNTER (OUTPATIENT)
Dept: LAB | Age: 71
DRG: 180 | End: 2021-01-01
Attending: FAMILY MEDICINE
Payer: MEDICARE

## 2021-01-01 ENCOUNTER — APPOINTMENT (OUTPATIENT)
Dept: CT IMAGING | Facility: HOSPITAL | Age: 71
DRG: 180 | End: 2021-01-01
Attending: INTERNAL MEDICINE
Payer: MEDICARE

## 2021-01-01 ENCOUNTER — HOSPITAL ENCOUNTER (INPATIENT)
Facility: HOSPITAL | Age: 71
LOS: 7 days | Discharge: HOSPICE | DRG: 180 | End: 2021-01-01
Attending: EMERGENCY MEDICINE | Admitting: INTERNAL MEDICINE
Payer: MEDICARE

## 2021-01-01 ENCOUNTER — APPOINTMENT (OUTPATIENT)
Dept: ULTRASOUND IMAGING | Facility: HOSPITAL | Age: 71
DRG: 180 | End: 2021-01-01
Attending: INTERNAL MEDICINE
Payer: MEDICARE

## 2021-01-01 ENCOUNTER — APPOINTMENT (OUTPATIENT)
Dept: MRI IMAGING | Facility: HOSPITAL | Age: 71
DRG: 180 | End: 2021-01-01
Attending: INTERNAL MEDICINE
Payer: MEDICARE

## 2021-01-01 ENCOUNTER — TELEPHONE (OUTPATIENT)
Dept: SURGERY | Facility: CLINIC | Age: 71
End: 2021-01-01

## 2021-01-01 VITALS
WEIGHT: 170 LBS | OXYGEN SATURATION: 95 % | HEART RATE: 80 BPM | BODY MASS INDEX: 28.32 KG/M2 | DIASTOLIC BLOOD PRESSURE: 76 MMHG | HEIGHT: 65 IN | SYSTOLIC BLOOD PRESSURE: 121 MMHG

## 2021-01-01 VITALS
DIASTOLIC BLOOD PRESSURE: 60 MMHG | SYSTOLIC BLOOD PRESSURE: 106 MMHG | HEIGHT: 65 IN | WEIGHT: 183 LBS | BODY MASS INDEX: 30.49 KG/M2

## 2021-01-01 VITALS
WEIGHT: 160.13 LBS | HEART RATE: 97 BPM | RESPIRATION RATE: 16 BRPM | BODY MASS INDEX: 26.68 KG/M2 | SYSTOLIC BLOOD PRESSURE: 134 MMHG | TEMPERATURE: 98 F | DIASTOLIC BLOOD PRESSURE: 80 MMHG | HEIGHT: 65 IN | OXYGEN SATURATION: 96 %

## 2021-01-01 VITALS
DIASTOLIC BLOOD PRESSURE: 70 MMHG | OXYGEN SATURATION: 96 % | BODY MASS INDEX: 28.82 KG/M2 | HEART RATE: 92 BPM | SYSTOLIC BLOOD PRESSURE: 111 MMHG | HEIGHT: 65 IN | TEMPERATURE: 98 F | WEIGHT: 173 LBS

## 2021-01-01 VITALS
BODY MASS INDEX: 30.49 KG/M2 | DIASTOLIC BLOOD PRESSURE: 75 MMHG | WEIGHT: 183 LBS | HEIGHT: 65 IN | TEMPERATURE: 98 F | SYSTOLIC BLOOD PRESSURE: 131 MMHG | HEART RATE: 101 BPM

## 2021-01-01 VITALS
HEART RATE: 87 BPM | HEIGHT: 65 IN | TEMPERATURE: 98 F | RESPIRATION RATE: 32 BRPM | BODY MASS INDEX: 29.49 KG/M2 | SYSTOLIC BLOOD PRESSURE: 127 MMHG | TEMPERATURE: 98 F | HEART RATE: 91 BPM | OXYGEN SATURATION: 96 % | WEIGHT: 177 LBS | DIASTOLIC BLOOD PRESSURE: 65 MMHG | DIASTOLIC BLOOD PRESSURE: 70 MMHG | SYSTOLIC BLOOD PRESSURE: 152 MMHG

## 2021-01-01 VITALS
BODY MASS INDEX: 26.07 KG/M2 | RESPIRATION RATE: 18 BRPM | TEMPERATURE: 99 F | WEIGHT: 172 LBS | HEART RATE: 79 BPM | SYSTOLIC BLOOD PRESSURE: 157 MMHG | HEIGHT: 68 IN | OXYGEN SATURATION: 98 % | DIASTOLIC BLOOD PRESSURE: 73 MMHG

## 2021-01-01 VITALS
WEIGHT: 177 LBS | HEIGHT: 65 IN | HEART RATE: 81 BPM | DIASTOLIC BLOOD PRESSURE: 75 MMHG | SYSTOLIC BLOOD PRESSURE: 143 MMHG | BODY MASS INDEX: 29.49 KG/M2

## 2021-01-01 DIAGNOSIS — F41.9 ANXIETY DISORDER, UNSPECIFIED TYPE: ICD-10-CM

## 2021-01-01 DIAGNOSIS — I25.10 CORONARY ARTERY CALCIFICATION: ICD-10-CM

## 2021-01-01 DIAGNOSIS — I10 ESSENTIAL HYPERTENSION: ICD-10-CM

## 2021-01-01 DIAGNOSIS — M79.18 RIGHT BUTTOCK PAIN: Primary | ICD-10-CM

## 2021-01-01 DIAGNOSIS — R06.02 SHORTNESS OF BREATH: ICD-10-CM

## 2021-01-01 DIAGNOSIS — E11.9 CONTROLLED TYPE 2 DIABETES MELLITUS WITHOUT COMPLICATION, WITHOUT LONG-TERM CURRENT USE OF INSULIN (HCC): ICD-10-CM

## 2021-01-01 DIAGNOSIS — R31.29 MICROSCOPIC HEMATURIA: ICD-10-CM

## 2021-01-01 DIAGNOSIS — F17.200 CURRENT EVERY DAY SMOKER: ICD-10-CM

## 2021-01-01 DIAGNOSIS — I25.10 CORONARY ARTERY CALCIFICATION: Primary | ICD-10-CM

## 2021-01-01 DIAGNOSIS — E87.1 HYPONATREMIA: ICD-10-CM

## 2021-01-01 DIAGNOSIS — Z00.00 ENCOUNTER FOR ANNUAL HEALTH EXAMINATION: Primary | ICD-10-CM

## 2021-01-01 DIAGNOSIS — R94.31 ABNORMAL EKG: ICD-10-CM

## 2021-01-01 DIAGNOSIS — Z23 NEED FOR TDAP VACCINATION: ICD-10-CM

## 2021-01-01 DIAGNOSIS — E78.00 HYPERCHOLESTEREMIA: ICD-10-CM

## 2021-01-01 DIAGNOSIS — G40.909 SEIZURE DISORDER (HCC): ICD-10-CM

## 2021-01-01 DIAGNOSIS — E11.9 CONTROLLED TYPE 2 DIABETES MELLITUS WITHOUT COMPLICATION, WITHOUT LONG-TERM CURRENT USE OF INSULIN (HCC): Primary | ICD-10-CM

## 2021-01-01 DIAGNOSIS — E87.1 HYPOSMOLALITY: ICD-10-CM

## 2021-01-01 DIAGNOSIS — R53.83 FATIGUE, UNSPECIFIED TYPE: Primary | ICD-10-CM

## 2021-01-01 DIAGNOSIS — R53.83 FATIGUE, UNSPECIFIED TYPE: ICD-10-CM

## 2021-01-01 DIAGNOSIS — J90 PLEURAL EFFUSION, RIGHT: Primary | ICD-10-CM

## 2021-01-01 DIAGNOSIS — I50.9 ACUTE ON CHRONIC CONGESTIVE HEART FAILURE, UNSPECIFIED HEART FAILURE TYPE (HCC): ICD-10-CM

## 2021-01-01 DIAGNOSIS — E78.00 HYPERCHOLESTEREMIA: Primary | ICD-10-CM

## 2021-01-01 DIAGNOSIS — C79.51 MALIGNANT NEOPLASM METASTATIC TO BONE (HCC): ICD-10-CM

## 2021-01-01 DIAGNOSIS — Z87.891 PERSONAL HISTORY OF TOBACCO USE: ICD-10-CM

## 2021-01-01 DIAGNOSIS — S69.92XA HAND INJURY, LEFT, INITIAL ENCOUNTER: ICD-10-CM

## 2021-01-01 DIAGNOSIS — N40.0 BENIGN NON-NODULAR PROSTATIC HYPERPLASIA WITHOUT LOWER URINARY TRACT SYMPTOMS: ICD-10-CM

## 2021-01-01 DIAGNOSIS — I70.0 ATHEROSCLEROSIS OF AORTA (HCC): ICD-10-CM

## 2021-01-01 DIAGNOSIS — Z23 NEED FOR VACCINATION: Primary | ICD-10-CM

## 2021-01-01 DIAGNOSIS — L30.9 HAND DERMATITIS: ICD-10-CM

## 2021-01-01 DIAGNOSIS — E87.1 HYPONATREMIA: Primary | ICD-10-CM

## 2021-01-01 DIAGNOSIS — D12.6 TUBULAR ADENOMA OF COLON: ICD-10-CM

## 2021-01-01 DIAGNOSIS — M15.9 PRIMARY OSTEOARTHRITIS INVOLVING MULTIPLE JOINTS: ICD-10-CM

## 2021-01-01 DIAGNOSIS — R31.29 MICROHEMATURIA: Primary | ICD-10-CM

## 2021-01-01 DIAGNOSIS — E66.3 OVERWEIGHT (BMI 25.0-29.9): ICD-10-CM

## 2021-01-01 DIAGNOSIS — J43.9 PULMONARY EMPHYSEMA, UNSPECIFIED EMPHYSEMA TYPE (HCC): ICD-10-CM

## 2021-01-01 DIAGNOSIS — J90 PLEURAL EFFUSION: ICD-10-CM

## 2021-01-01 DIAGNOSIS — R53.1 WEAKNESS: ICD-10-CM

## 2021-01-01 DIAGNOSIS — R56.9 SEIZURE (HCC): Primary | ICD-10-CM

## 2021-01-01 DIAGNOSIS — Z23 NEED FOR VACCINATION: ICD-10-CM

## 2021-01-01 DIAGNOSIS — I25.84 CORONARY ARTERY CALCIFICATION: ICD-10-CM

## 2021-01-01 DIAGNOSIS — M85.80 OSTEOPENIA, UNSPECIFIED LOCATION: ICD-10-CM

## 2021-01-01 DIAGNOSIS — M79.18 RIGHT BUTTOCK PAIN: ICD-10-CM

## 2021-01-01 DIAGNOSIS — R53.1 WEAKNESS: Primary | ICD-10-CM

## 2021-01-01 DIAGNOSIS — R41.82 ALTERED MENTAL STATUS, UNSPECIFIED ALTERED MENTAL STATUS TYPE: Primary | ICD-10-CM

## 2021-01-01 DIAGNOSIS — F17.200 SMOKER: ICD-10-CM

## 2021-01-01 DIAGNOSIS — R06.00 DYSPNEA, UNSPECIFIED TYPE: Primary | ICD-10-CM

## 2021-01-01 DIAGNOSIS — I25.84 CORONARY ARTERY CALCIFICATION: Primary | ICD-10-CM

## 2021-01-01 DIAGNOSIS — R63.0 POOR APPETITE: ICD-10-CM

## 2021-01-01 DIAGNOSIS — R63.0 DECREASED APPETITE: ICD-10-CM

## 2021-01-01 LAB
ALBUMIN SERPL-MCNC: 3.4 G/DL (ref 3.4–5)
ALBUMIN SERPL-MCNC: 3.5 G/DL (ref 3.4–5)
ALBUMIN/GLOB SERPL: 0.7 {RATIO} (ref 1–2)
ALBUMIN/GLOB SERPL: 0.8 {RATIO} (ref 1–2)
ALP LIVER SERPL-CCNC: 77 U/L
ALP LIVER SERPL-CCNC: 82 U/L
ALT SERPL-CCNC: 26 U/L
ALT SERPL-CCNC: 30 U/L
ANION GAP SERPL CALC-SCNC: 6 MMOL/L (ref 0–18)
ANION GAP SERPL CALC-SCNC: 6 MMOL/L (ref 0–18)
AST SERPL-CCNC: 17 U/L (ref 15–37)
AST SERPL-CCNC: 22 U/L (ref 15–37)
BILIRUB SERPL-MCNC: 0.2 MG/DL (ref 0.1–2)
BILIRUB SERPL-MCNC: 0.3 MG/DL (ref 0.1–2)
BILIRUB UR QL: NEGATIVE
BILIRUB UR QL: NEGATIVE
BUN BLD-MCNC: 11 MG/DL (ref 7–18)
BUN BLD-MCNC: 23 MG/DL (ref 7–18)
BUN/CREAT SERPL: 18.6 (ref 10–20)
BUN/CREAT SERPL: 28.8 (ref 10–20)
CALCIUM BLD-MCNC: 9.5 MG/DL (ref 8.5–10.1)
CALCIUM BLD-MCNC: 9.5 MG/DL (ref 8.5–10.1)
CHLORIDE SERPL-SCNC: 103 MMOL/L (ref 98–112)
CHLORIDE SERPL-SCNC: 90 MMOL/L (ref 98–112)
CHOLEST SERPL-MCNC: 196 MG/DL (ref ?–200)
CLARITY UR: CLEAR
CLARITY UR: CLEAR
CO2 SERPL-SCNC: 29 MMOL/L (ref 21–32)
CO2 SERPL-SCNC: 30 MMOL/L (ref 21–32)
COLOR UR: YELLOW
COLOR UR: YELLOW
CREAT BLD-MCNC: 0.59 MG/DL
CREAT BLD-MCNC: 0.8 MG/DL
CREAT UR-SCNC: 117 MG/DL
ERYTHROCYTE [SEDIMENTATION RATE] IN BLOOD: 27 MM/HR
EST. AVERAGE GLUCOSE BLD GHB EST-MCNC: 137 MG/DL (ref 68–126)
EST. AVERAGE GLUCOSE BLD GHB EST-MCNC: 140 MG/DL (ref 68–126)
GLOBULIN PLAS-MCNC: 4.6 G/DL (ref 2.8–4.4)
GLOBULIN PLAS-MCNC: 4.7 G/DL (ref 2.8–4.4)
GLUCOSE BLD-MCNC: 90 MG/DL (ref 70–99)
GLUCOSE BLD-MCNC: 94 MG/DL (ref 70–99)
GLUCOSE UR-MCNC: NEGATIVE MG/DL
GLUCOSE UR-MCNC: NEGATIVE MG/DL
HBA1C MFR BLD HPLC: 6.4 % (ref ?–5.7)
HBA1C MFR BLD HPLC: 6.5 % (ref ?–5.7)
HDLC SERPL-MCNC: 67 MG/DL (ref 40–59)
KETONES UR-MCNC: NEGATIVE MG/DL
KETONES UR-MCNC: NEGATIVE MG/DL
LDLC SERPL CALC-MCNC: 114 MG/DL (ref ?–100)
LEUKOCYTE ESTERASE UR QL STRIP.AUTO: NEGATIVE
LEUKOCYTE ESTERASE UR QL STRIP.AUTO: NEGATIVE
M PROTEIN MFR SERPL ELPH: 8.1 G/DL (ref 6.4–8.2)
MICROALBUMIN UR-MCNC: 2.55 MG/DL
MICROALBUMIN/CREAT 24H UR-RTO: 21.8 UG/MG (ref ?–30)
NITRITE UR QL STRIP.AUTO: NEGATIVE
NITRITE UR QL STRIP.AUTO: NEGATIVE
NONHDLC SERPL-MCNC: 129 MG/DL (ref ?–130)
OSMOLALITY SERPL CALC.SUM OF ELEC: 261 MOSM/KG (ref 275–295)
OSMOLALITY SERPL CALC.SUM OF ELEC: 289 MOSM/KG (ref 275–295)
PATIENT FASTING Y/N/NP: YES
PH UR: 5 [PH] (ref 5–8)
PH UR: 6 [PH] (ref 5–8)
POTASSIUM SERPL-SCNC: 4.2 MMOL/L (ref 3.5–5.1)
POTASSIUM SERPL-SCNC: 4.2 MMOL/L (ref 3.5–5.1)
PROT SERPL-MCNC: 8.1 G/DL (ref 6.4–8.2)
PROT UR-MCNC: NEGATIVE MG/DL
PROT UR-MCNC: NEGATIVE MG/DL
SODIUM SERPL-SCNC: 126 MMOL/L (ref 136–145)
SODIUM SERPL-SCNC: 138 MMOL/L (ref 136–145)
SP GR UR STRIP: 1.02 (ref 1–1.03)
SP GR UR STRIP: 1.02 (ref 1–1.03)
TRIGL SERPL-MCNC: 81 MG/DL (ref 30–149)
TSI SER-ACNC: 1.04 MIU/ML (ref 0.36–3.74)
UROBILINOGEN UR STRIP-ACNC: <2
UROBILINOGEN UR STRIP-ACNC: <2
VLDLC SERPL CALC-MCNC: 14 MG/DL (ref 0–30)

## 2021-01-01 PROCEDURE — 3075F SYST BP GE 130 - 139MM HG: CPT | Performed by: FAMILY MEDICINE

## 2021-01-01 PROCEDURE — 3077F SYST BP >= 140 MM HG: CPT | Performed by: NURSE PRACTITIONER

## 2021-01-01 PROCEDURE — 3078F DIAST BP <80 MM HG: CPT | Performed by: FAMILY MEDICINE

## 2021-01-01 PROCEDURE — 80053 COMPREHEN METABOLIC PANEL: CPT

## 2021-01-01 PROCEDURE — 3075F SYST BP GE 130 - 139MM HG: CPT | Performed by: INTERNAL MEDICINE

## 2021-01-01 PROCEDURE — 93306 TTE W/DOPPLER COMPLETE: CPT | Performed by: INTERNAL MEDICINE

## 2021-01-01 PROCEDURE — 36415 COLL VENOUS BLD VENIPUNCTURE: CPT

## 2021-01-01 PROCEDURE — 99233 SBSQ HOSP IP/OBS HIGH 50: CPT | Performed by: HOSPITALIST

## 2021-01-01 PROCEDURE — 3074F SYST BP LT 130 MM HG: CPT | Performed by: FAMILY MEDICINE

## 2021-01-01 PROCEDURE — 99233 SBSQ HOSP IP/OBS HIGH 50: CPT | Performed by: INTERNAL MEDICINE

## 2021-01-01 PROCEDURE — 99223 1ST HOSP IP/OBS HIGH 75: CPT | Performed by: INTERNAL MEDICINE

## 2021-01-01 PROCEDURE — 84443 ASSAY THYROID STIM HORMONE: CPT

## 2021-01-01 PROCEDURE — 83036 HEMOGLOBIN GLYCOSYLATED A1C: CPT

## 2021-01-01 PROCEDURE — 83935 ASSAY OF URINE OSMOLALITY: CPT

## 2021-01-01 PROCEDURE — 82570 ASSAY OF URINE CREATININE: CPT

## 2021-01-01 PROCEDURE — 90662 IIV NO PRSV INCREASED AG IM: CPT | Performed by: FAMILY MEDICINE

## 2021-01-01 PROCEDURE — 99232 SBSQ HOSP IP/OBS MODERATE 35: CPT | Performed by: INTERNAL MEDICINE

## 2021-01-01 PROCEDURE — 80061 LIPID PANEL: CPT

## 2021-01-01 PROCEDURE — 3008F BODY MASS INDEX DOCD: CPT | Performed by: FAMILY MEDICINE

## 2021-01-01 PROCEDURE — 99213 OFFICE O/P EST LOW 20 MIN: CPT | Performed by: OTHER

## 2021-01-01 PROCEDURE — 71046 X-RAY EXAM CHEST 2 VIEWS: CPT | Performed by: FAMILY MEDICINE

## 2021-01-01 PROCEDURE — 0001A SARSCOV2 VAC 30MCG/0.3ML IM: CPT

## 2021-01-01 PROCEDURE — 85025 COMPLETE CBC W/AUTO DIFF WBC: CPT | Performed by: EMERGENCY MEDICINE

## 2021-01-01 PROCEDURE — 32554 ASPIRATE PLEURA W/O IMAGING: CPT | Performed by: INTERNAL MEDICINE

## 2021-01-01 PROCEDURE — 80048 BASIC METABOLIC PNL TOTAL CA: CPT

## 2021-01-01 PROCEDURE — G0008 ADMIN INFLUENZA VIRUS VAC: HCPCS | Performed by: FAMILY MEDICINE

## 2021-01-01 PROCEDURE — 90472 IMMUNIZATION ADMIN EACH ADD: CPT | Performed by: FAMILY MEDICINE

## 2021-01-01 PROCEDURE — 3044F HG A1C LEVEL LT 7.0%: CPT | Performed by: FAMILY MEDICINE

## 2021-01-01 PROCEDURE — 81001 URINALYSIS AUTO W/SCOPE: CPT

## 2021-01-01 PROCEDURE — 96360 HYDRATION IV INFUSION INIT: CPT

## 2021-01-01 PROCEDURE — 71045 X-RAY EXAM CHEST 1 VIEW: CPT | Performed by: INTERNAL MEDICINE

## 2021-01-01 PROCEDURE — 80048 BASIC METABOLIC PNL TOTAL CA: CPT | Performed by: EMERGENCY MEDICINE

## 2021-01-01 PROCEDURE — 3078F DIAST BP <80 MM HG: CPT | Performed by: INTERNAL MEDICINE

## 2021-01-01 PROCEDURE — A9575 INJ GADOTERATE MEGLUMI 0.1ML: HCPCS

## 2021-01-01 PROCEDURE — 96160 PT-FOCUSED HLTH RISK ASSMT: CPT | Performed by: FAMILY MEDICINE

## 2021-01-01 PROCEDURE — 99239 HOSP IP/OBS DSCHRG MGMT >30: CPT | Performed by: HOSPITALIST

## 2021-01-01 PROCEDURE — 99203 OFFICE O/P NEW LOW 30 MIN: CPT | Performed by: NURSE PRACTITIONER

## 2021-01-01 PROCEDURE — 3008F BODY MASS INDEX DOCD: CPT | Performed by: NURSE PRACTITIONER

## 2021-01-01 PROCEDURE — 99214 OFFICE O/P EST MOD 30 MIN: CPT | Performed by: FAMILY MEDICINE

## 2021-01-01 PROCEDURE — 73130 X-RAY EXAM OF HAND: CPT | Performed by: FAMILY MEDICINE

## 2021-01-01 PROCEDURE — G0439 PPPS, SUBSEQ VISIT: HCPCS | Performed by: FAMILY MEDICINE

## 2021-01-01 PROCEDURE — 77080 DXA BONE DENSITY AXIAL: CPT | Performed by: FAMILY MEDICINE

## 2021-01-01 PROCEDURE — 85025 COMPLETE CBC W/AUTO DIFF WBC: CPT

## 2021-01-01 PROCEDURE — 3008F BODY MASS INDEX DOCD: CPT | Performed by: OTHER

## 2021-01-01 PROCEDURE — 3008F BODY MASS INDEX DOCD: CPT | Performed by: INTERNAL MEDICINE

## 2021-01-01 PROCEDURE — 32555 ASPIRATE PLEURA W/ IMAGING: CPT | Performed by: INTERNAL MEDICINE

## 2021-01-01 PROCEDURE — 71045 X-RAY EXAM CHEST 1 VIEW: CPT | Performed by: EMERGENCY MEDICINE

## 2021-01-01 PROCEDURE — 99202 OFFICE O/P NEW SF 15 MIN: CPT

## 2021-01-01 PROCEDURE — 3074F SYST BP LT 130 MM HG: CPT | Performed by: OTHER

## 2021-01-01 PROCEDURE — 99284 EMERGENCY DEPT VISIT MOD MDM: CPT

## 2021-01-01 PROCEDURE — 0002A SARSCOV2 VAC 30MCG/0.3ML IM: CPT

## 2021-01-01 PROCEDURE — 0W9930Z DRAINAGE OF RIGHT PLEURAL CAVITY WITH DRAINAGE DEVICE, PERCUTANEOUS APPROACH: ICD-10-PCS | Performed by: RADIOLOGY

## 2021-01-01 PROCEDURE — 81001 URINALYSIS AUTO W/SCOPE: CPT | Performed by: EMERGENCY MEDICINE

## 2021-01-01 PROCEDURE — 72202 X-RAY EXAM SI JOINTS 3/> VWS: CPT | Performed by: FAMILY MEDICINE

## 2021-01-01 PROCEDURE — 99397 PER PM REEVAL EST PAT 65+ YR: CPT | Performed by: FAMILY MEDICINE

## 2021-01-01 PROCEDURE — 90715 TDAP VACCINE 7 YRS/> IM: CPT | Performed by: FAMILY MEDICINE

## 2021-01-01 PROCEDURE — 70553 MRI BRAIN STEM W/O & W/DYE: CPT | Performed by: INTERNAL MEDICINE

## 2021-01-01 PROCEDURE — 0W993ZX DRAINAGE OF RIGHT PLEURAL CAVITY, PERCUTANEOUS APPROACH, DIAGNOSTIC: ICD-10-PCS | Performed by: INTERNAL MEDICINE

## 2021-01-01 PROCEDURE — 85652 RBC SED RATE AUTOMATED: CPT

## 2021-01-01 PROCEDURE — 84300 ASSAY OF URINE SODIUM: CPT

## 2021-01-01 PROCEDURE — 3078F DIAST BP <80 MM HG: CPT | Performed by: OTHER

## 2021-01-01 PROCEDURE — 93005 ELECTROCARDIOGRAM TRACING: CPT

## 2021-01-01 PROCEDURE — 71260 CT THORAX DX C+: CPT | Performed by: INTERNAL MEDICINE

## 2021-01-01 PROCEDURE — 3078F DIAST BP <80 MM HG: CPT | Performed by: NURSE PRACTITIONER

## 2021-01-01 PROCEDURE — 99213 OFFICE O/P EST LOW 20 MIN: CPT

## 2021-01-01 PROCEDURE — 93010 ELECTROCARDIOGRAM REPORT: CPT | Performed by: FAMILY MEDICINE

## 2021-01-01 PROCEDURE — 3061F NEG MICROALBUMINURIA REV: CPT | Performed by: FAMILY MEDICINE

## 2021-01-01 PROCEDURE — 82043 UR ALBUMIN QUANTITATIVE: CPT

## 2021-01-01 PROCEDURE — 74178 CT ABD&PLV WO CNTR FLWD CNTR: CPT | Performed by: INTERNAL MEDICINE

## 2021-01-01 RX ORDER — ZOLPIDEM TARTRATE 5 MG/1
5 TABLET ORAL NIGHTLY PRN
Status: DISCONTINUED | OUTPATIENT
Start: 2021-01-01 | End: 2021-01-01

## 2021-01-01 RX ORDER — SIMVASTATIN 40 MG
40 TABLET ORAL NIGHTLY
Qty: 90 TABLET | Refills: 3 | Status: SHIPPED | OUTPATIENT
Start: 2021-01-01 | End: 2021-01-01 | Stop reason: ALTCHOICE

## 2021-01-01 RX ORDER — NITROGLYCERIN 0.4 MG/1
0.4 TABLET SUBLINGUAL EVERY 5 MIN PRN
Status: DISCONTINUED | OUTPATIENT
Start: 2021-01-01 | End: 2021-01-01

## 2021-01-01 RX ORDER — DEXTROSE MONOHYDRATE 25 G/50ML
50 INJECTION, SOLUTION INTRAVENOUS
Status: DISCONTINUED | OUTPATIENT
Start: 2021-01-01 | End: 2021-01-01

## 2021-01-01 RX ORDER — CARBAMAZEPINE 200 MG/1
TABLET ORAL
Qty: 60 TABLET | Refills: 0 | Status: SHIPPED | OUTPATIENT
Start: 2021-01-01 | End: 2021-01-01

## 2021-01-01 RX ORDER — ATORVASTATIN CALCIUM 20 MG/1
20 TABLET, FILM COATED ORAL NIGHTLY
Qty: 90 TABLET | Refills: 1 | Status: SHIPPED | OUTPATIENT
Start: 2021-01-01

## 2021-01-01 RX ORDER — CARBAMAZEPINE 200 MG/1
200 TABLET ORAL 2 TIMES DAILY
Qty: 180 TABLET | Refills: 3 | Status: SHIPPED | OUTPATIENT
Start: 2021-01-01

## 2021-01-01 RX ORDER — BLOOD-GLUCOSE METER
1 EACH MISCELLANEOUS ONCE
Qty: 1 EACH | Refills: 0 | Status: SHIPPED | OUTPATIENT
Start: 2021-01-01 | End: 2021-01-01

## 2021-01-01 RX ORDER — FUROSEMIDE 10 MG/ML
40 INJECTION INTRAMUSCULAR; INTRAVENOUS DAILY
Status: DISCONTINUED | OUTPATIENT
Start: 2021-01-01 | End: 2021-01-01

## 2021-01-01 RX ORDER — HYDROCHLOROTHIAZIDE 25 MG/1
25 TABLET ORAL EVERY MORNING
Qty: 90 TABLET | Refills: 3 | Status: SHIPPED | OUTPATIENT
Start: 2021-01-01 | End: 2021-01-01

## 2021-01-01 RX ORDER — LORAZEPAM 1 MG/1
1 TABLET ORAL EVERY 4 HOURS PRN
Status: DISCONTINUED | OUTPATIENT
Start: 2021-01-01 | End: 2021-01-01

## 2021-01-01 RX ORDER — GLUCOSAM/CHON-MSM1/C/MANG/BOSW 500-416.6
TABLET ORAL
Qty: 100 EACH | Refills: 3 | Status: SHIPPED | OUTPATIENT
Start: 2021-01-01

## 2021-01-01 RX ORDER — CARBAMAZEPINE 100 MG/1
TABLET, CHEWABLE ORAL
Qty: 90 TABLET | Refills: 3 | Status: SHIPPED | OUTPATIENT
Start: 2021-01-01

## 2021-01-01 RX ORDER — LORAZEPAM 2 MG/ML
INJECTION INTRAMUSCULAR
Status: DISPENSED
Start: 2021-01-01 | End: 2021-01-01

## 2021-01-01 RX ORDER — BLOOD-GLUCOSE CONTROL, LOW
1 EACH MISCELLANEOUS ONCE
Qty: 1 EACH | Refills: 0 | Status: SHIPPED | OUTPATIENT
Start: 2021-01-01 | End: 2021-01-01

## 2021-01-01 RX ORDER — HALOPERIDOL 1 MG/1
1 TABLET ORAL EVERY 6 HOURS PRN
Status: DISCONTINUED | OUTPATIENT
Start: 2021-01-01 | End: 2021-01-01

## 2021-01-01 RX ORDER — CALCIUM CITRATE/VITAMIN D3 200MG-6.25
TABLET ORAL
Refills: 0 | Status: CANCELLED | OUTPATIENT
Start: 2021-01-01

## 2021-01-01 RX ORDER — HYDROCODONE BITARTRATE AND ACETAMINOPHEN 5; 325 MG/1; MG/1
1 TABLET ORAL EVERY 4 HOURS PRN
Qty: 20 TABLET | Refills: 0 | Status: SHIPPED | OUTPATIENT
Start: 2021-01-01

## 2021-01-01 RX ORDER — DIPHENHYDRAMINE HYDROCHLORIDE 50 MG/ML
INJECTION INTRAMUSCULAR; INTRAVENOUS
Status: COMPLETED
Start: 2021-01-01 | End: 2021-01-01

## 2021-01-01 RX ORDER — MAGNESIUM OXIDE 400 MG (241.3 MG MAGNESIUM) TABLET
400 TABLET ONCE
Status: DISCONTINUED | OUTPATIENT
Start: 2021-01-01 | End: 2021-01-01

## 2021-01-01 RX ORDER — TEMAZEPAM 15 MG/1
15 CAPSULE ORAL NIGHTLY PRN
Qty: 30 CAPSULE | Refills: 0 | Status: SHIPPED | OUTPATIENT
Start: 2021-01-01

## 2021-01-01 RX ORDER — AMLODIPINE BESYLATE 2.5 MG/1
2.5 TABLET ORAL DAILY
Status: DISCONTINUED | OUTPATIENT
Start: 2021-01-01 | End: 2021-01-01

## 2021-01-01 RX ORDER — FUROSEMIDE 40 MG/1
40 TABLET ORAL
Status: DISCONTINUED | OUTPATIENT
Start: 2021-01-01 | End: 2021-01-01

## 2021-01-01 RX ORDER — DIAZEPAM 2 MG/1
5 TABLET ORAL NIGHTLY PRN
Status: DISCONTINUED | OUTPATIENT
Start: 2021-01-01 | End: 2021-01-01

## 2021-01-01 RX ORDER — LOSARTAN POTASSIUM 25 MG/1
25 TABLET ORAL DAILY
Qty: 90 TABLET | Refills: 1 | Status: SHIPPED | OUTPATIENT
Start: 2021-01-01 | End: 2021-01-01

## 2021-01-01 RX ORDER — DIVALPROEX SODIUM 500 MG/1
TABLET, DELAYED RELEASE ORAL
Qty: 60 TABLET | Refills: 0 | Status: SHIPPED | OUTPATIENT
Start: 2021-01-01 | End: 2021-01-01

## 2021-01-01 RX ORDER — HYDROCODONE BITARTRATE AND ACETAMINOPHEN 5; 325 MG/1; MG/1
2 TABLET ORAL EVERY 4 HOURS PRN
Status: DISCONTINUED | OUTPATIENT
Start: 2021-01-01 | End: 2021-01-01

## 2021-01-01 RX ORDER — ALBUMIN (HUMAN) 12.5 G/50ML
12.5 SOLUTION INTRAVENOUS ONCE
Status: COMPLETED | OUTPATIENT
Start: 2021-01-01 | End: 2021-01-01

## 2021-01-01 RX ORDER — HYDROCODONE BITARTRATE AND ACETAMINOPHEN 5; 325 MG/1; MG/1
1 TABLET ORAL EVERY 4 HOURS PRN
Status: DISCONTINUED | OUTPATIENT
Start: 2021-01-01 | End: 2021-01-01

## 2021-01-01 RX ORDER — CARBAMAZEPINE 100 MG/1
TABLET, CHEWABLE ORAL
Qty: 30 TABLET | Refills: 0 | Status: SHIPPED | OUTPATIENT
Start: 2021-01-01 | End: 2021-01-01

## 2021-01-01 RX ORDER — CALCIUM CITRATE/VITAMIN D3 200MG-6.25
1 TABLET ORAL DAILY
Qty: 100 STRIP | Refills: 3 | Status: SHIPPED | OUTPATIENT
Start: 2021-01-01

## 2021-01-01 RX ORDER — FUROSEMIDE 10 MG/ML
40 INJECTION INTRAMUSCULAR; INTRAVENOUS
Status: DISCONTINUED | OUTPATIENT
Start: 2021-01-01 | End: 2021-01-01

## 2021-01-01 RX ORDER — AMLODIPINE BESYLATE 2.5 MG/1
2.5 TABLET ORAL DAILY
Qty: 30 TABLET | Refills: 0 | Status: SHIPPED | OUTPATIENT
Start: 2021-01-01

## 2021-01-01 RX ORDER — LIDOCAINE HYDROCHLORIDE 20 MG/ML
INJECTION, SOLUTION EPIDURAL; INFILTRATION; INTRACAUDAL; PERINEURAL
Status: COMPLETED
Start: 2021-01-01 | End: 2021-01-01

## 2021-01-01 RX ORDER — FUROSEMIDE 10 MG/ML
40 INJECTION INTRAMUSCULAR; INTRAVENOUS ONCE
Status: COMPLETED | OUTPATIENT
Start: 2021-01-01 | End: 2021-01-01

## 2021-01-01 RX ORDER — CEPHALEXIN 500 MG/1
500 CAPSULE ORAL 3 TIMES DAILY
Qty: 21 CAPSULE | Refills: 0 | Status: SHIPPED | OUTPATIENT
Start: 2021-01-01 | End: 2021-01-01

## 2021-01-01 RX ORDER — DIVALPROEX SODIUM 500 MG/1
500 TABLET, DELAYED RELEASE ORAL 2 TIMES DAILY
Qty: 180 TABLET | Refills: 3 | Status: SHIPPED | OUTPATIENT
Start: 2021-01-01

## 2021-01-01 RX ORDER — HALOPERIDOL 5 MG/ML
INJECTION INTRAMUSCULAR
Status: DISPENSED
Start: 2021-01-01 | End: 2021-01-01

## 2021-01-01 RX ORDER — ALBUMIN (HUMAN) 12.5 G/50ML
SOLUTION INTRAVENOUS
Status: DISPENSED
Start: 2021-01-01 | End: 2021-01-01

## 2021-01-01 RX ORDER — HALOPERIDOL 5 MG/ML
1 INJECTION INTRAMUSCULAR EVERY 4 HOURS PRN
Status: DISCONTINUED | OUTPATIENT
Start: 2021-01-01 | End: 2021-01-01

## 2021-01-01 RX ORDER — LOSARTAN POTASSIUM 25 MG/1
25 TABLET ORAL DAILY
Qty: 90 TABLET | Refills: 0 | Status: SHIPPED | OUTPATIENT
Start: 2021-01-01 | End: 2021-01-01

## 2021-01-01 RX ORDER — ACETAMINOPHEN 325 MG/1
650 TABLET ORAL EVERY 4 HOURS PRN
Status: DISCONTINUED | OUTPATIENT
Start: 2021-01-01 | End: 2021-01-01

## 2021-01-01 RX ORDER — CARBAMAZEPINE 200 MG/1
200 TABLET ORAL 2 TIMES DAILY
Status: DISCONTINUED | OUTPATIENT
Start: 2021-01-01 | End: 2021-01-01

## 2021-01-01 RX ORDER — CARBAMAZEPINE 100 MG/1
100 TABLET, CHEWABLE ORAL DAILY
Status: DISCONTINUED | OUTPATIENT
Start: 2021-01-01 | End: 2021-01-01

## 2021-01-01 RX ORDER — HYDROCHLOROTHIAZIDE 25 MG/1
25 TABLET ORAL EVERY MORNING
Qty: 90 TABLET | Refills: 0 | Status: SHIPPED | OUTPATIENT
Start: 2021-01-01 | End: 2021-01-01

## 2021-01-01 RX ORDER — UBIQUINOL 100 MG
CAPSULE ORAL
Qty: 100 EACH | Refills: 3 | Status: SHIPPED | OUTPATIENT
Start: 2021-01-01

## 2021-01-01 RX ORDER — LOSARTAN POTASSIUM 50 MG/1
50 TABLET ORAL DAILY
Status: DISCONTINUED | OUTPATIENT
Start: 2021-01-01 | End: 2021-01-01

## 2021-01-01 RX ORDER — LORAZEPAM 0.5 MG/1
0.5 TABLET ORAL EVERY 4 HOURS PRN
Status: DISCONTINUED | OUTPATIENT
Start: 2021-01-01 | End: 2021-01-01

## 2021-01-01 RX ORDER — LOSARTAN POTASSIUM 25 MG/1
25 TABLET ORAL DAILY
Qty: 90 TABLET | Refills: 3 | Status: SHIPPED | OUTPATIENT
Start: 2021-01-01

## 2021-01-01 RX ORDER — TEMAZEPAM 7.5 MG/1
15 CAPSULE ORAL NIGHTLY PRN
Status: DISCONTINUED | OUTPATIENT
Start: 2021-01-01 | End: 2021-01-01

## 2021-01-01 RX ORDER — HALOPERIDOL 1 MG/1
1 TABLET ORAL EVERY 6 HOURS PRN
Qty: 20 TABLET | Refills: 0 | Status: SHIPPED | OUTPATIENT
Start: 2021-01-01

## 2021-01-01 RX ORDER — LORAZEPAM 1 MG/1
1 TABLET ORAL EVERY 4 HOURS PRN
Qty: 20 TABLET | Refills: 0 | Status: SHIPPED | OUTPATIENT
Start: 2021-01-01

## 2021-01-01 RX ORDER — HEPARIN SODIUM 5000 [USP'U]/ML
5000 INJECTION, SOLUTION INTRAVENOUS; SUBCUTANEOUS EVERY 8 HOURS SCHEDULED
Status: DISCONTINUED | OUTPATIENT
Start: 2021-01-01 | End: 2021-01-01

## 2021-01-01 RX ORDER — DIVALPROEX SODIUM 500 MG/1
500 TABLET, DELAYED RELEASE ORAL 2 TIMES DAILY
Status: DISCONTINUED | OUTPATIENT
Start: 2021-01-01 | End: 2021-01-01

## 2021-01-01 RX ORDER — LORAZEPAM 2 MG/ML
1 INJECTION INTRAMUSCULAR ONCE
Status: COMPLETED | OUTPATIENT
Start: 2021-01-01 | End: 2021-01-01

## 2021-01-01 RX ORDER — BLOOD-GLUCOSE CONTROL, LOW
EACH MISCELLANEOUS
COMMUNITY
Start: 2021-01-01

## 2021-01-01 RX ORDER — SERTRALINE HYDROCHLORIDE 100 MG/1
50 TABLET, FILM COATED ORAL DAILY
Qty: 90 TABLET | Refills: 0 | Status: ON HOLD | OUTPATIENT
Start: 2021-01-01 | End: 2021-01-01

## 2021-01-01 RX ORDER — ATORVASTATIN CALCIUM 20 MG/1
20 TABLET, FILM COATED ORAL NIGHTLY
Status: DISCONTINUED | OUTPATIENT
Start: 2021-01-01 | End: 2021-01-01

## 2021-01-01 RX ORDER — ASPIRIN 81 MG/1
81 TABLET ORAL DAILY
Status: DISCONTINUED | OUTPATIENT
Start: 2021-01-01 | End: 2021-01-01

## 2021-01-05 NOTE — TELEPHONE ENCOUNTER
Wife informed of message. [id number] - [place], [address], [phone]   Outpatient Medication Detail     Disp Refills Start End    Losartan Potassium 25 MG Oral Tab 90 tablet 1 12/30/2020     Sig - Route:  Take

## 2021-02-24 PROBLEM — H52.03 HYPEROPIA WITH PRESBYOPIA, BILATERAL: Status: RESOLVED | Noted: 2018-03-30 | Resolved: 2021-01-01

## 2021-02-24 PROBLEM — I11.0 HYPERTENSIVE HEART DISEASE WITH HEART FAILURE (HCC): Status: RESOLVED | Noted: 2020-09-15 | Resolved: 2021-01-01

## 2021-02-24 PROBLEM — I51.89 GRADE I DIASTOLIC DYSFUNCTION: Status: RESOLVED | Noted: 2019-02-09 | Resolved: 2021-01-01

## 2021-02-24 PROBLEM — H25.13 AGE-RELATED NUCLEAR CATARACT OF BOTH EYES: Status: RESOLVED | Noted: 2018-03-30 | Resolved: 2021-01-01

## 2021-02-24 PROBLEM — B35.1 ONYCHOMYCOSIS: Status: RESOLVED | Noted: 2019-09-17 | Resolved: 2021-01-01

## 2021-02-24 PROBLEM — H52.4 HYPEROPIA WITH PRESBYOPIA, BILATERAL: Status: RESOLVED | Noted: 2018-03-30 | Resolved: 2021-01-01

## 2021-02-24 NOTE — PATIENT INSTRUCTIONS
[place] SCREENING SCHEDULE   Tests on this list are recommended by your physician but may not be covered, or covered at this frequency, by your insurer. Please check with your insurance carrier before scheduling to verify coverage.     Tunde Cantu Covered every 10 years- more often if abnormal Colonoscopy due on 01/03/2024 Update Health Maintenance if applicable    Flex Sigmoidoscopy Screen  Covered every 5 years No results found for this or any previous visit. No flowsheet data found.      Fecal O injectable drug abusers     Tetanus Toxoid- Only covered with a cut with metal- TD and TDaP Not covered by Medicare Part B) No orders found for this or any previous visit.  This may be covered with your prescription benefits, but Medicare does not cover unl

## 2021-02-24 NOTE — PROGRESS NOTES
.  HPI:   Skyla Arreaga is a 65year old male who presents for a MA (Medicare Advantage) [facility] [facility] (Once per calendar year).       Annual Physical due on 02/24/2022    Doing well  Still smoking 3-4 cigs a day        Fall/Risk Assessment   He has been as YES and refresh this link)        Mr. Juliann Ivan already takes aspirin and has it on his medication list.   CAGE Alcohol screening   José Miguel Braun was screened for Alcohol abuse and had a score of 0 so is at low risk.      Patient Care Team: Patient metFORMIN HCl 500 MG Oral Tab, Take 1 tablet (500 mg total) by mouth daily with breakfast.    •  divalproex Sodium 500 MG Oral Tab EC DR tab, Take 1 tablet (500 mg total) by mouth 2 (two) times daily.     •  carBAMazepine 100 MG Oral Chew Tab, Chew 1 tablet No I have trouble following the conversations when two or more people are talking at the same time: No   I have trouble understanding things on the TV: No I have to strain to understand conversations: No   I have to worry about missing the telephone ring o Soft, non-tender, bowel sounds active all four quadrants,  no masses, no organomegaly   Genitalia: Normal male   Rectal: Normal tone, normal prostate, no masses or tenderness   Extremities: Extremities normal, atraumatic, no cyanosis or edema   Pulses: 2+ INTERNAL REFERRAL    Tubular adenoma of colon    Primary osteoarthritis involving multiple joints    Controlled type 2 diabetes mellitus without complication, without long-term current use of insulin (HCC)  -     MICROALB/CREAT RATIO, RANDOM URINE;  Future non-nodular prostatic hyperplasia without lower urinary tract symptoms  Encourage patient to follow-up with urology  - URINALYSIS, ROUTINE; Future  - UROLOGY - INTERNAL    10. Essential hypertension  Blood pressure stable.   Continue current medication fernando Exercise  How would you describe your current health state?: Fair  How do you maintain positive mental well-being?: Social Interaction    This section provided for quick review of chart, separate sheet to patient  Jacinto Prado in the same house as a HepB virus carrier   Homosexual men   Illicit injectable drug abusers     Tetanus Toxoid  Only covered with a cut with metal- TD and TDaP Not covered by Medicare Part B) No vaccine history found This may be covered with your prescrip clear, specific, and personalized behavior change advice, including information about personal health harms and benefits. Specifically, he was told that Quitting tobacco is one of the best things he can do for his health. I strongly encouraged him to quit.

## 2021-03-04 PROBLEM — R31.29 MICROSCOPIC HEMATURIA: Status: ACTIVE | Noted: 2018-02-13

## 2021-08-30 NOTE — TELEPHONE ENCOUNTER
Refill request for divaloproex 500 mg, BID, #60, no refills    Refill request for carbamazepine 100 mg, take 1 tab daily, #30, no refills    Refill request for carbamazepine 200 mg, BID, #60, no refills    LOV: 8/4/20  NOV: None

## 2021-09-07 NOTE — TELEPHONE ENCOUNTER
pt calling to request refill of DIVALPROEX 500 MG Oral Tab EC DR tab    send to [address]    Please advise

## 2021-09-14 NOTE — PROGRESS NOTES
Mr. Gaby Kapadia, is in the office with his wife. No seizures over the last year. He denies headache, neck pain, low back pain. No trouble speech, language. No new medical issues. No surgeries. No new medications.     They relate he has an appointment

## 2021-09-23 NOTE — PROGRESS NOTES
HPI:    Patient ID: Shae Santoyo is a 66year old male.     HPI  Patient presents with:  Hip Pain  Foot Swelling  Hand Pain: left hand itchiness on left hand for almost 3 weeks pt not sure if he hit himself with a door     Wt Readings from Last 6 Enc problems and confusion.        /70   Pulse 87   Temp 97.6 °F (36.4 °C) (Temporal)   Ht 5' 5\" (1.651 m)   Wt 177 lb (80.3 kg)   BMI 29.45 kg/m²          Current Outpatient Medications   Medication Sig Dispense Refill   • triamcinolone acetonide 0.1 % route one time for 1 dose.  1 each 0     Allergies:  Lisinopril              Coughing  Seasonal                ITCHING    Comment:Itching on eyes and sneezing   PHYSICAL EXAM:   Patient presents with:  Hip Pain  Foot Swelling  Hand Pain: left hand itchiness management     4. Overweight (BMI 25.0-29. 9)  Highly recommend to lose weight. Discussed good dietary and eating habits as well as increasing vegetable and fruit intake. Recommending avoiding foods high in fat content.   Recommend exercising at least 30-4 Referrals:  UROLOGY - INTERNAL  FLU VACC HIGH DOSE PRSV FREE  TETANUS, DIPHTHERIA TOXOIDS AND ACELLULAR PERTUSIS VACCINE (TDAP), >7 YEARS, IM USE  XR HAND (2 VIEWS), LEFT (CPT=73120)  XR SACROILIAC JOINTS (MIN 3 VIEWS) (CPT=72202)       #[id number]

## 2021-09-23 NOTE — TELEPHONE ENCOUNTER
Rx: BD Single Use swab    Rx: True Metrix Level 1 CTRL SOLN    Rx: True Metrix Blood Glucose Mtr    Rx: [employer] True Metrix Test Strip    Rx: Truplus 33g Lancets

## 2021-09-24 NOTE — TELEPHONE ENCOUNTER
Refilled 9-23-21. .       Future Appointments   Date Time Provider Shay Gamboa   9/30/2021  2:30 PM [facility]   10/7/2021 11:15 AM Shanel Nix MD [facility] Hanna Chatman

## 2021-09-30 PROBLEM — E87.1 HYPONATREMIA: Status: ACTIVE | Noted: 2021-01-01

## 2021-09-30 NOTE — PROGRESS NOTES
Subjective:     Patient ID: Maria Ines Mora is a 66year old male. HPI     Patient is a 66year old male who presents to the clinic for a consult at the request of, and a copy of this note will be sent to, Dr. Austen Paulino regarding microhematuria.   Pa glucose once daily 100 each 3   • Glucose Blood (TRUE METRIX BLOOD GLUCOSE TEST) In Vitro Strip 1 each by In Vitro route daily. 100 strip 3   • triamcinolone acetonide 0.1 % External Cream Apply topically 2 (two) times daily.  45 g 1   • carBAMazepine 200 M History: Social History    Tobacco Use      Smoking status: Current Every Day Smoker        Packs/day: 0.25        Years: 40.00        Pack years: 10        Types: Cigarettes      Smokeless tobacco: Never Used      Tobacco comment: 4-5 cig / daily     Vapi well as a cystoscopy to evaluate the bladder and urethra. Further management and recommendations will be based on the results of the workup as outlined above. The patient wishes to proceed with the workup as discussed.   They asked appropriate questions

## 2021-09-30 NOTE — TELEPHONE ENCOUNTER
True metrix blood glucose mtr  humana true metrix test strip  trueplus 33g lancets  BD SINGLE USE SWAB  TRUE METRIX LEVEL 1 TRL SOLN

## 2021-10-02 NOTE — ED PROVIDER NOTES
Patient Seen in: [facility] AND [facility] Emergency Department    History   Patient presents with:  Abnormal Result    Stated Complaint: hyponatremia     HPI    Patient presents with family member who is concerned because his blood sodium level was 126 last wee atorvastatin (LIPITOR) 20 MG Oral Tab,  Take 1 tablet (20 mg total) by mouth nightly.    metFORMIN 500 MG Oral Tab,  Take 1 tablet (500 mg total) by mouth daily with breakfast.   hydroCHLOROthiazide 25 MG Oral Tab,  Take 1 tablet (25 mg total) by mouth ever nourished adult lying in bed in no distress. Vital signs noted. Eye:  No scleral icterus. Eyelids appear normal, no lesions. Cardiovascular:  Normal S1 and S2, no murmur, regular, with good peripheral perfusion.   Respiratory:  Lungs clear to auscultati components:    Monocyte Absolute 1.10 (*)     All other components within normal limits   CBC WITH DIFFERENTIAL WITH PLATELET    Narrative: The following orders were created for panel order CBC With Differential With Platelet.   Procedure

## 2021-10-02 NOTE — ED PROVIDER NOTES
Patient Seen in: [facility]      History   Patient presents with:  Abnormal Labs    Stated Complaint: sodium level low, lethargic, confused    Subjective:   HPI    67 yo male recently found to be hyponatremic with sodium of 126. hydrochloroth specified.         Medications Prescribed:  Current Discharge Medication List

## 2021-10-04 NOTE — TELEPHONE ENCOUNTER
----- Message from ALFREDO Price sent at 10/4/2021  9:50 AM CDT -----  Please let patient know his urine culture shows no bacterial growth. Urine cytology is negative for any abnormal cells. Urinalysis continues to show microscopic blood.   Co

## 2021-10-04 NOTE — TELEPHONE ENCOUNTER
S/w wife and let her know of UA results. States pt was in ER 2 days ago and UA was done there as well and was negative. No further questions or concerns. Will follow up with CT and already has cysto scheduled in Dec. 28th, 2021.

## 2021-10-07 PROBLEM — E87.1 HYPOSMOLALITY: Status: ACTIVE | Noted: 2021-01-01

## 2021-10-07 NOTE — PROGRESS NOTES
HPI:    Patient ID: Ovi Cortes is a 66year old male. HPI  Patient presents with:  ER F/U    Patient seen in the emergency room on October 2 with his blood sodium level being 126 last week.   Patient was more tired and had increased urination th Encounters:  10/07/21 : 173 lb (78.5 kg)  10/02/21 : 172 lb (78 kg)  09/30/21 : 177 lb (80.3 kg)  09/23/21 : 177 lb (80.3 kg)  09/14/21 : 183 lb (83 kg)  02/24/21 : 183 lb (83 kg)      Wife states patient is not very verbal.  Usually he likes to sleep on a 100 each 3   • Glucose Blood (TRUE METRIX BLOOD GLUCOSE TEST) In Vitro Strip 1 each by In Vitro route daily. 100 strip 3   • triamcinolone acetonide 0.1 % External Cream Apply topically 2 (two) times daily.  45 g 1   • carBAMazepine 200 MG Oral Tab Take 1 t use      6. Decreased appetite  Monitor appetite      No orders of the defined types were placed in this encounter. The above note was creating using Dragon speech recognition technology.  Please excuse any typos    Meds This Visit:  Requested Prescr

## 2021-10-12 NOTE — TELEPHONE ENCOUNTER
Spoke with Adelfo Partida from [facility] [employer] Was wondering about the microscopic part of the urinalysis for pt before authorizing CT urogram, specifically the blood in urine. Told her RBC's read 6-10. States that is acceptable, and gave PA # [id number].

## 2021-10-12 NOTE — TELEPHONE ENCOUNTER
Andres Wright from [employer] clinical RN reviewer on behalf of [facility] states needs the microscopic part of the urinalysis dd receive the urinalysis but missing microscopic part please advise        Cale Mancilla can be faxed or verbal

## 2021-10-20 PROBLEM — F41.9 ANXIETY DISORDER: Status: ACTIVE | Noted: 2021-01-01

## 2021-10-20 NOTE — PROGRESS NOTES
HPI:    Patient ID: Tahmina Ching is a 66year old male. HPI  Patient presents with:   Follow - Up: steel feeling weak, feet swelling, unable to sleep, shortness of breath when walking and  no appetite       Wt Readings from Last 6 Encounters:  10/ atorvastatin (LIPITOR) 20 MG Oral Tab Take 1 tablet (20 mg total) by mouth nightly. 90 tablet 1   • Losartan Potassium 25 MG Oral Tab Take 1 tablet (25 mg total) by mouth daily.  90 tablet 3   • Alcohol Swabs (ALCOHOL PREP) 70 % Does not apply Pads Use once Deep Tendon Reflexes: Reflexes normal.   Psychiatric:         Attention and Perception: Attention and perception normal.         Mood and Affect: Mood is anxious. Speech: Speech is delayed.          Behavior: Behavior normal.         Thought Co

## 2021-10-21 NOTE — TELEPHONE ENCOUNTER
Please see note below. Pt has apt scheduled with Dr. Jayme Eduardo on 11/8/21. Can he been added on any any sooner? Schedules are booked out. Thanks!

## 2021-10-21 NOTE — TELEPHONE ENCOUNTER
Discussed results with spouse, Aurea Villanueva, on MAGDA.    ----- Message from Dustin Pino MD sent at 10/21/2021  3:36 PM CDT -----  Sodium levels remain low and about the same.   Please inform spouse  Limit water intake to no more than 1 L/day  Please see if we

## 2021-10-21 NOTE — TELEPHONE ENCOUNTER
Nephrology RN group:   Patient has appt with Dr Nati Jackson 11/8/21. Can patient be accommodated for sooner appt? (2) assistive person

## 2021-10-22 NOTE — TELEPHONE ENCOUNTER
Wife Tarun Jonesshanta calling back. Advised wife of Dr Jamie Whiting and Wanda Bird RN's note. She verbalized understanding.

## 2021-10-26 PROBLEM — J90 PLEURAL EFFUSION: Status: ACTIVE | Noted: 2021-01-01

## 2021-10-26 PROBLEM — R06.00 DYSPNEA: Status: ACTIVE | Noted: 2021-01-01

## 2021-10-26 PROBLEM — R06.00 DYSPNEA, UNSPECIFIED TYPE: Status: ACTIVE | Noted: 2021-01-01

## 2021-10-26 PROBLEM — I50.9 ACUTE ON CHRONIC CONGESTIVE HEART FAILURE, UNSPECIFIED HEART FAILURE TYPE (HCC): Status: ACTIVE | Noted: 2021-01-01

## 2021-10-26 NOTE — ED INITIAL ASSESSMENT (HPI)
Tom Dave arrives with his wife who reports patient had difficulty sleeping last night, woke up at 6 AM stating \" I can't breathe. \"  Patient arrives hypoxic, tachypnic and complaining of chest discomfort.    Patient was taken off his diuretic due to his s

## 2021-10-26 NOTE — ED QUICK NOTES
Orders for admission, patient is aware of plan and ready to go upstairs. Any questions, please call ED JIM villegas  at extension [phone]. Pt came from home with c/o sob.   Pt lives with his wife, is aox4, independent, continent, has been using the urinal.

## 2021-10-26 NOTE — CONSULTS
[facility]  Cardiology Consultation    Bernarda Blanco Patient Status:  Inpatient    1950 MRN [id number]   Location [facility] 3W/SW Attending Josue Jamil MD   Hosp Day # 0 PCP Darlyn Nix MD     Date of Admissio Brother         thyroid   • Heart Disorder Neg    • Glaucoma Neg      Social History  Lives at home with wife and daughter. +Tobacco since age 19, quit smoking several weeks ago. No alcohol or illicit drug use use.   Patient Guardians:  Not on file    Other daily.       Allergies    Lisinopril              Coughing  Seasonal                ITCHING    Comment:Itching on eyes and sneezing    Review of Systems:   14 point ROS negative unless otherwise stated in HPI    Physical Exam:     Patient Vitals for the pas TROP <0.045 10/26/2021         Recent Labs   Lab 10/20/21  1555 10/26/21  0903   * 144*   BUN 8 9   CREATSERUM 0.47* 0.43*   GFRAA 127 132   GFRNAA 110 114   CA 9.5 9.1   * 125*   K 4.8 4.4   CL 91* 91*   CO2 32.0 26.0     Recent Labs   Lab effusion    Hyponatremia  - Na 125, likely hypervolemic hyponatremia, consider occult malignancy with history of weight loss  - Diurese as above  - Continue to monitor sodium    HTN  - Remains hypertensive today  - Not on antihypertensives, will restart lo

## 2021-10-26 NOTE — CONSULTS
[facility] - [facility]    Report of Consultation    Farrell Olszewski Patient Status:  Emergency    1950 MRN [id number]   Location [address] Attending Medardo Salguero MD   Hosp Day # 0 PCP Asma M. Remonia Kehr, MD     Date of History:  Social History    Tobacco Use      Smoking status: Current Every Day Smoker        Packs/day: 0.25        Years: 40.00        Pack years: 10        Types: Cigarettes      Smokeless tobacco: Never Used      Tobacco comment: 4-5 cig / daily     Vap wheezes  Minimal rales in the left base  No rhonchi   Abdominal: Bowel sounds are normal. He exhibits distension. There is no abdominal tenderness. There is no rebound and no guarding. Lymphadenopathy:     He has no cervical adenopathy.    Neurological: H 2- COPD   Lifelong h/o smoking   Neb / O2   Out-pt PFTs     3- dyspnea   Secondary to above   O2 therapy   Thoracentesis     4- DVT prophylaxis   Heparin subcutaneous       Mihir Meadows MD  10/26/2021

## 2021-10-26 NOTE — ED PROVIDER NOTES
Patient Seen in: [facility] AND [facility] Emergency Department      History   Patient presents with:  Difficulty Breathing    Stated Complaint: stephany, bipedal edema    Subjective:   HPI    Patient is a 64-year-old male with history listed below.   His wife states 0844 (!) 36   Temp 10/26/21 0943 98.9 °F (37.2 °C)   Temp src 10/26/21 0844 Oral   SpO2 10/26/21 0844 (!) 86 %   O2 Device 10/26/21 0844 None (Room air)       Current:BP (!) 198/92   Pulse (!) 121   Temp 98.9 °F (37.2 °C) (Temporal)   Resp (!) 28   Ht 165. Natriuretic Peptide 374 (*)     All other components within normal limits   CBC W/ DIFFERENTIAL - Abnormal; Notable for the following components:    WBC 13.7 (*)     Neutrophil Absolute Prelim 11.56 (*)     Neutrophil Absolute 11.56 (*)     Lymphocyte Abso PORTABLE  (CPT=71045)    Result Date: 10/26/2021  CONCLUSION:  1. Moderate to large right pleural effusion. Compressive atelectasis in the right lower lung. Coexistent pneumonia should be excluded clinically.  2. Suggestion of mild pulmonary venous conges Impression:  Dyspnea, unspecified type  (primary encounter diagnosis)  Pleural effusion  Acute on chronic congestive heart failure, unspecified heart failure type [facility])     Disposition:  Admit  10/26/2021 10:08 am    Follow-up:  No follow-up provider speci

## 2021-10-26 NOTE — H&P
[facility]  HISTORY AND PHYSICAL       Sarahy Iron Patient Status:  Inpatient    1950  66year old CSN [id number]   Location 325/325-A Attending Toya Diaz MD     PCP Darlyn Nix MD         DATE OF ADMISSION: 10/26 Location: EM ENDOSCOPY       ALLERGIES   Lisinopril and Seasonal    MEDICATIONS  Current Discharge Medication List    CONTINUE these medications which have NOT CHANGED    atorvastatin (LIPITOR) 20 MG Oral Tab  Take 1 tablet (20 mg total) by mouth nightly. dermatitis    divalproex 500 MG Oral Tab EC DR tab  Take 1 tablet (500 mg total) by mouth 2 (two) times daily.   Qty: 180 tablet Refills: 3    !! TRIAMCINOLONE ACETONIDE 0.1 % External Cream  APPLY TOPICALLY TWICE DAILY AS NEEDED  Qty: 60 g Refills: 3    Ca Soft and non-tender. Mild distention noted. MUSCULOSKELETAL:  No obvious abnormalities noted  EXTREMITIES: 2+ bilateral lower extremity edema appreciated  NEUROLOGICAL: Awake and alert, oriented to person and hospital.  Confused at times.   Repetitive w needed    Altered mental status  -Likely secondary to metabolic encephalopathy  -Continue treating underlying conditions as above  -Continue to monitor    70 minutes spent on this admission - discussing with other providers, examining patient, obtaining hi

## 2021-10-27 NOTE — PAYOR COMM NOTE
--------------  ADMISSION REVIEW     Payor: [employer]  Subscriber #:  [id number]  Authorization Number: [id number]       ED Provider Notes        History   Patient presents with:  Difficulty Breathing    Stated Complaint: stephany, bipedal edema    Subjective Left Ear: External ear normal.   Nose: Nose normal.   Mouth/Throat: Oropharynx is clear and moist.   Eyes: Conjunctivae and EOM are normal. Pupils are equal, round, and reactive to light. Neck: Neck supple.    Cardiovascular: Normal rate, regular rhythm Rhythm  Reading: Sinus tachycardia nonspecific ST changes. XR CHEST AP PORTABLE  (CPT=71045)    Result Date: 10/26/2021  CONCLUSION:  1. Moderate to large right pleural effusion. Compressive atelectasis in the right lower lung.   Coexistent pneumoni patient due to mild confusion. At time of interview, patient reported he was feeling better, but remains confused. Patient otherwise denied chest pain, abdominal pain, nausea vomiting fevers or chills. History supplemented by family at bedside.   Wif IV BID  - Strict I&Os, Daily weights, Fluid restriction    Hyponatremia  -Likely hypervolemic hyponatremia  -Continue diuresis as above  -Close monitoring of sodium levels  -Nephrology consulted, appreciate recommendations    Hypertension  -Elevated  -Star restart losartan at 50 mg daily        10/26 THORA    Thoracentesis Procedure Note     Pre-operative Diagnosis: right pleural effusion      Post-operative Diagnosis: Same     Indications: Diagnostic and therapeutic removal of pleural fluid    Findings  100 after thoracentesis, will follow-up echocardiogram and continue IV diuresis for today. Hyponatremia: Improving slowly, lethargy and mental status somewhat improved today. Per primary.     HTN: Improved on current regimen, no changes          10/27 HOSP 100 mL IVPB-ADDV     Date Action Dose Route User    10/26/2021 1650 New Bag 1 g Intravenous Vincent Lua, JIM      divalproex (DEPAKOTE) DR tab 500 mg     Date Action Dose Route User    10/27/2021 0927 Given 500 mg Oral Andrew Kitchen RN    10/26/202

## 2021-10-27 NOTE — PLAN OF CARE
AO3, generalized weakness. Pt can be anxious and impulsive at times. 2L NC.  CXR showed fluid overload. S/P thoracentesis with 1L removed. IV abx for pneumonia.   Notified Dr. Camila Shahid of albumin level and lasix IVP to restart this AM.      Problem: KELLIE medications as ordered  - Initiate emergency measures for life threatening arrhythmias  - Monitor electrolytes and administer replacement therapy as ordered  Outcome: Progressing

## 2021-10-27 NOTE — CARDIAC REHAB
CARDIAC REHAB HEART FAILURE EDUCATION: daughter Katelynn as  during consult    Handouts provided and reviewed: CHF Booklet.    Reviewed the following: Disease process and management reviewed during Introductory consult with daughter regarding un

## 2021-10-27 NOTE — PROGRESS NOTES
[place] [facility] - [facility]    Progress Note    Carolann Hensley Patient Status:  Inpatient    1950 MRN [id number]   Location [facility] 3W/SW Attending Valdemar Montanez MD   [facility] Day # 1 PCP Darlyn Nix MD     Subjective:     Ranjan Layton 10/27/2021    TP 7.6 10/27/2021    AST 18 10/27/2021    ALT 33 10/27/2021    TSH 0.549 10/27/2021    PSA 2.1 03/24/2017    DDIMER <0.27 07/02/2018    ESRML 27 (H) 09/24/2021    MG 1.8 10/27/2021    PHOS 4.3 10/26/2021    TROP <0.045 10/26/2021       CT REYNA right thoracentesis.     Dictated by (CST): Zoe Llanos MD on 10/26/2021 at 2:20 PM     Finalized by (CST): Zoe Llanos MD on 10/26/2021 at 2:20 PM          EKG    Result Date: 10/26/2021  ECG Report  Interpretation  -------------------------- S

## 2021-10-27 NOTE — PROGRESS NOTES
[place] [facility] - [facility]    Progress Note    Alli Garcia Patient Status:  Inpatient    1950 MRN [id number]   Location [facility] 3W/SW Attending Wilmar Rich MD   [facility] Day # 1 PCP Darlyn Nix MD     Subjective:   Maria Guadalupe Dinero <0.045 10/26/2021       XR CHEST AP PORTABLE  (CPT=71045)    Result Date: 10/26/2021  CONCLUSION:  * No complication noted status post right thoracentesis.  * Improved aeration of the right lung although persistent effusion/consolidation/atelectasis opacifi recommendation  -Cardiology on consult, recommend diuresis, appreciate further recommendations  -Check echocardiogram  -Follow-up thoracentesis and fluid studies  - Diuresis with Lasix 40mg IV BID  - Strict I&Os, Daily weights, Fluid restriction     Hypona

## 2021-10-27 NOTE — PROGRESS NOTES
[facility]  Nephrology Daily Progress Note    Shwetha Philip  [id number]  66year old      HPI:   Shwetha Philip is a 66year old male. Comfortable at rest.  SOB a little better.         ROS:     Constitutional:  Negative for decr .0 10/27/2021    CREATSERUM 0.49 10/27/2021    BUN 9 10/27/2021     10/27/2021    K 4.1 10/27/2021    CL 90 10/27/2021    CO2 32.0 10/27/2021    GLU 99 10/27/2021    CA 9.2 10/27/2021    ALB 2.4 10/27/2021    ALKPHO 72 10/27/2021    BILT 0. Mary May MD on 10/26/2021 at 2:20 PM     Finalized by (CST): Dilma Gamboa MD on 10/26/2021 at 2:20 PM              Medications:    Current Facility-Administered Medications:   •  cefTRIAXone Sodium (ROCEPHIN) 1 g in sodium chloride 0.9% 100 mL IVPB-AD sneezing    Input/Output:    Intake/Output Summary (Last 24 hours) at 10/27/2021 1238  Last data filed at 10/27/2021 1003  Gross per 24 hour   Intake 710 ml   Output 770 ml   Net -60 ml          ASSESSMENT/PLAN:   Assessment   Patient Active Problem List:

## 2021-10-27 NOTE — CONSULTS
[facility]    Report of Consultation    Date of Admission:  10/26/2021  Date of Consult:  10/26/2021   Reason for Consultation:     Hyponatremia     History of Present Illness:   Patient is a 66year old male with pmh of emphysema, smoker History  Patient Guardians:  Not on file    Other Topics            Concern  Caffeine Concern        Yes    Comment:1 cup of coffee in the morning  Exercise                Yes    Comment:walk, cycling at home    Social History Narrative    None on file Coughing  Seasonal                ITCHING    Comment:Itching on eyes and sneezing    Review of Systems:   Constitutional: fatigue and weight loss   Eyes: negative for irritation, redness and visual disturbance  Ears, nose, mouth, throat, and face: ne Lab 10/20/21  1555 10/26/21  0903 10/26/21  1653   * 144* 133*   BUN 8 9 9   CREATSERUM 0.47* 0.43* 0.55*   GFRAA 127 132 120   GFRNAA 110 114 103   CA 9.5 9.1 9.3   ALB  --  2.9*  --    * 125* 127*   K 4.8 4.4 4.7   CL 91* 91* 92*   CO2 32. patient.     Ashok Carter MD  10/26/2021

## 2021-10-27 NOTE — PROGRESS NOTES
Orders received, chart reviewed. Attempted OT evaluation X2 this AM. Pt off unit. Will reattempt as able.     Loi Moreno   Occupational Therapist  [facility]

## 2021-10-27 NOTE — CONSULTS
[facility]    Report of Consultation    Chris Rucker Patient Status:  Inpatient    1950 MRN [id number]   Location [facility] 3W/SW Attending Stephen Scanlon MD   [facility] Day # 1 PCP Darlyn Nix MD     Consultation da Gerard Nuñez MD;  Location: [address] [place] [address] ENDOSCOPY   • COLONOSCOPY N/A 1/3/2019    Procedure: COLONOSCOPY;  Surgeon: Nata Roman MD;  Location: [address] [place] [address] ENDOSCOPY       Family History  Family History   Problem Relation Age of Onset   • Diabetes 5000 UNIT/ML injection 5,000 Units, 5,000 Units, Subcutaneous, Q8H LITZY  acetaminophen (TYLENOL) tab 650 mg, 650 mg, Oral, Q4H PRN   Or  HYDROcodone-acetaminophen (NORCO) 5-325 MG per tab 1 tablet, 1 tablet, Oral, Q4H PRN   Or  HYDROcodone-acetaminophen (NO HPI    Physical Exam:   Vital Signs:  Blood pressure 133/77, pulse 99, temperature 98.7 °F (37.1 °C), temperature source Oral, resp. rate 18, height 1.651 m (5' 5\"), weight 73.9 kg (162 lb 14.4 oz), SpO2 93 %.      General: Altered mental status, appears a associated passive atelectasis involving the right greater than left lung. 3. Mild paraseptal emphysema. 4. Stable simple hepatic cysts.     Dictated by (CST): Eagle Paul MD on 10/27/2021 at 12:34 PM     Finalized by (CST): Eagle Paul MD on than 50 pack smoking history and significant alcohol use admitted for worsening dyspnea, imaging consistent large right-sided pleural effusion status post thoracentesis and CT imaging shows left lung mass.     Patient is status post right-sided thoracentesi

## 2021-10-27 NOTE — PROGRESS NOTES
Cardiology Progress Note    Narciso Last Patient Status:  Inpatient    1950 MRN [id number]   Location [facility] 3W/SW Attending Hazel Lam MD   [facility] Day # 1 PCP Darlyn Oshea MD     Mr Sherren Andes is a 67 yo M who presents wi Procedure: COLONOSCOPY;  Surgeon: Ana Garrett MD;  Location: [address] [place] [address] ENDOSCOPY   • COLONOSCOPY N/A 1/3/2019    Procedure: COLONOSCOPY;  Surgeon: Ana Garrett MD;  Location: [address] [place] [address] ENDOSCOPY     Family History   Problem Relation Age

## 2021-10-27 NOTE — PHYSICAL THERAPY NOTE
PHYSICAL THERAPY EVALUATION - INPATIENT     Room Number: 325/325-A  Evaluation Date: 10/27/2021  Type of Evaluation: Initial   Physician Order: PT Eval and Treat  Need     Presenting Problem:  (lung CA dyspnea)  Reason for Therapy: Mobility Dys current admission:      Problem List  Principal Problem:    Dyspnea, unspecified type  Active Problems:    Essential hypertension    Hyponatremia    Dyspnea    Pleural effusion    Acute on chronic congestive heart failure, unspecified heart failure type (H over in bed (including adjusting bedclothes, sheets and blankets)?: A Little   -   Sitting down on and standing up from a chair with arms (e.g., wheelchair, bedside commode, etc.): A Little   -   Moving from lying on back to sitting on the side of the bed?

## 2021-10-27 NOTE — PLAN OF CARE
Problem: Patient Centered Care  Goal: Patient preferences are identified and integrated in the patient's plan of care  Description: Interventions:  - What would you like us to know as we care for you?  I live with my wife and daughter.  - Provide timely, and physical deficits and behaviors that affect risk of falls.   - [place] fall precautions as indicated by assessment.  - Educate pt/family on patient safety including physical limitations  - Instruct pt to call for assistance with activity based on asse with those who interact with patient  Outcome: Progressing     Problem: CARDIOVASCULAR - ADULT  Goal: Maintains optimal cardiac output and hemodynamic stability  Description: INTERVENTIONS:  - Monitor vital signs, rhythm, and trends  - Monitor for bleeding

## 2021-10-27 NOTE — DIETARY NOTE
ADULT NUTRITION INITIAL ASSESSMENT    Pt is at high nutrition risk. Pt meets severe malnutrition criteria.       CRITERIA FOR MALNUTRITION DIAGNOSIS:  Criteria for severe malnutrition diagnosis: acute illness/injury related to wt loss greater than 5% in 1 Supplements-RD added Magic Cup (290 calories/ 9 g protein each) [place] Daily. Rational/use of oral supplements discussed.   - Vitamin and mineral supplements: none  - Feeding assistance: meal set up and family orders for patient  - Nutrition education: jose 1-7 Units Subcutaneous TID CC   • losartan  50 mg Oral Daily   • melatonin  5 mg Oral Nightly   • furosemide  40 mg Intravenous Daily     LABS: reviewed  Recent Labs     10/26/21  0903 10/26/21  1653 10/27/21  0624   * 133* 99   BUN 9 9 9   CREATSER

## 2021-10-28 NOTE — PROGRESS NOTES
[facility]    Cardiology Progress Note    Amie Saunders Patient Status:  Inpatient    1950 MRN [id number]   Location [facility] 3W/SW Attending Hina Medley MD   [facility] Day # 2 PCP Darlyn Nix MD     Subjective Nightly   • furosemide  40 mg Intravenous Daily       Continuous Infusions:     Results:     Lab Results   Component Value Date    WBC 12.3 (H) 10/28/2021    HGB 14.0 10/28/2021    HCT 41.5 10/28/2021    .0 10/28/2021    CREATSERUM 0.36 (L) 10/28/20 lesions within the L2-L3 vertebral body suspicious for osseous metastases.   Left lower lobe pulmonary malignancy with a large right pleural effusion and near complete atelectasis of the right lower lobe, partially seen and better characterized on CT chest Marline Vanesa 05/20/1950 H: [id number]                                E: [id number] Study date:  Oct 27 2021 10:31AM              [facility]: [id number] HT:(65in) WT:(161.7lb)                       BP: 119 / 75 color Doppler, and intravenous contrast injection. Patient YOB: 1950. Patient is 71yr old. Gender: male. BMI: 27kg/m^2. BSA: 1.85m^2. Patient status: Inpatient. Study date:  Study date: 10/27/2021.  Study time: 10:31 AM.  Location:  Echo labor ------------------------------------------------------------------- Measurements  Left ventricle                 Value          07/13/2018 Reference  LV ID, ED, PLAX        (L)     3.6   cm       ---------- 4.2 - 5.8  LV ID, ES, PLAX        (L)     2.4   c velocity, S  Aortic valve VTI, S            38.4  cm       ---------- ---------  Aortic mean gradient,          14    mm Hg    ---------- ---------  S  Aortic peak gradient,          22    mm Hg    ---------- ---------  S  Aortic valve area, VTI         1. adenopathy. Neck: No JVD, carotids 2+, no bruits. Cardiac: Regular rate and rhythm. S1, S2 normal. No murmur, pericardial rub, S3, or extra cardiac sounds.   Lungs: Decreased breath sound RLL, Wheezing diffused throughout L side  Abdomen: Soft, non-tender

## 2021-10-28 NOTE — PROGRESS NOTES
Pulmonary/Critical Care Follow Up Note    HPI:   Lamberto Shepard is a 66year old male with Patient presents with:  Difficulty Breathing      PCP Darlyn Hussein MD  Admission Attending Valerio López 15 Day #2    Mild sob  Occ cough  No tablet, 1 tablet, Oral, Q4H PRN **OR** HYDROcodone-acetaminophen (NORCO) 5-325 MG per tab 2 tablet, 2 tablet, Oral, Q4H PRN  •  Insulin Aspart Pen (NOVOLOG) 100 UNIT/ML flexpen 1-7 Units, 1-7 Units, Subcutaneous, TID CC  •  losartan (COZAAR) tab 50 mg, 50

## 2021-10-28 NOTE — PAYOR COMM NOTE
--------------  CONTINUED STAY REVIEW    PayorLorri Najjar MA [facility]  Subscriber #:  [id number]  Authorization Number: [id number]    Admit date: 10/26/21  Admit time: 11:50 AM    Admitting Physician: Carito Cullen MD  Attending Physician:  Casey Nolasco, within the left hepatic lobe are suspicious for hepatic metastases.  Dedicated nonemergent liver MRI (or liver CT if patient cannot tolerate MRI) can   further characterize if clinically indicated.       Lucent lesions within the L2-L3 vertebral body suspic RN      atorvastatin (LIPITOR) tab 20 mg     Date Action Dose Route User    10/27/2021 2011 Given 20 mg Oral Rodríguez Ricci RN      azithromycin (ZITHROMAX) 500 mg in sodium chloride 0.9% 250 mL IVPB     Date Action Dose Route User    10/27/2021 115 Perflutren Lipid Microsphere (DEFINITY) 6.52 MG/ML injection 1.5 mL     Date Action Dose Route User    10/27/2021 1110 Given 1.5 mL Intravenous Barbara Eaton      zolpidem (AMBIEN) tab 5 mg     Date Action Dose Route User    10/27/2021 2011

## 2021-10-28 NOTE — PLAN OF CARE
Problem: Patient Centered Care  Goal: Patient preferences are identified and integrated in the patient's plan of care  Description: Interventions:  - What would you like us to know as we care for you?  I live with my wife and daughter.  - Provide timely, and physical deficits and behaviors that affect risk of falls.   - [place] fall precautions as indicated by assessment.  - Educate pt/family on patient safety including physical limitations  - Instruct pt to call for assistance with activity based on asse heart rate control medications as ordered  - Initiate emergency measures for life threatening arrhythmias  - Monitor electrolytes and administer replacement therapy as ordered  Outcome: Progressing

## 2021-10-28 NOTE — PROGRESS NOTES
[place] [facility] - [facility]    Progress Note    Alli Garcia Patient Status:  Inpatient    1950 MRN [id number]   Location [facility] 3W/SW Attending Wilmar Rich MD   [facility] Day # 2 PCP Darlyn Nix MD     Subjective:   Maria Guadalupe Dinero PHOS 3.4 10/28/2021    TROP <0.045 10/26/2021       CT CHEST(CONTRAST ONLY) (CPT=71260)    Result Date: 10/27/2021  CONCLUSION:  1. Partially necrotic 3.7 cm central left lower lobe/left infrahilar lung mass.   This finding is highly concerning for a leopoldo appreciate further recommendations  -Check echocardiogram  -Follow-up thoracentesis and fluid studies  - Diuresis with Lasix 40mg daily  - Strict I&Os, Daily weights, Fluid restriction     Hyponatremia  -Likely hypervolemic hyponatremia  -Continue diuresis

## 2021-10-28 NOTE — PROGRESS NOTES
[facility]    Progress Note      Subjective:     Patient sob improved but continue to have shortness of breath.  Improve leg swelling      Review of Systems:     Constitutional: fatigue and weight loss   Eyes: negative for irritation, redn (LIPITOR) tab 20 mg, 20 mg, Oral, Nightly  carBAMazepine (TEGRETOL) chewable tab 100 mg, 100 mg, Oral, Daily  carBAMazepine (TEGRETOL) tab 200 mg, 200 mg, Oral, BID  divalproex (DEPAKOTE) DR tab 500 mg, 500 mg, Oral, BID  glucose (DEX4) oral liquid 15 g, 1 90* 92*   CO2 26.0   < > 30.0 32.0 30.0   ALKPHO  --   --   --  72  --    AST  --   --   --  18  --    ALT  --   --   --  33  --    BILT  --   --   --  0.4  --    TP  --   --   --  7.6  --     < > = values in this interval not displayed.      No results fou 10/27/2021. Dictated by (CST): Que Garcia MD on 10/28/2021 at 8:14 AM     Finalized by (CST): Que Garcia MD on 10/28/2021 at 8:34 AM                Assessment and Plan:       1.  Hyponatremia:  - hypochloremic   - urine osm elevated and urine Na h

## 2021-10-28 NOTE — PLAN OF CARE
Date of Injury: 1/28/2019  Initial Treatment Date: 3/15/2019  Date Last Seen: 3/19/2019  Mechanism of Injury: Other  Occupation: student  Referred by:other  Primary Care Physician: Cabrera Siegel  Date informed consent signed:  3/15/2019  I have reviewed the past medical history, family history, social history, medications and allergies listed in the medical record as obtained by my nursing staff and support staff and agree with their documentation. Silvia Ordoñez  reports that  has never smoked. she has never used smokeless tobacco.  Silvia Ordoñez has No Known Allergies. Advance Directive Status:no   Visit Number of Current Episode: 3  Initial pain scale: 5/10    Patient Emotional screening was completed 3/15/2019; [place] Pain Supplemental Questionnaire score was 11/40. During the past 24 hours, how has pain interfered with normal activities: 3/10  During the past 24 hours, how has pain interfered with your sleep: 4/10  During the past 24 hours, how has pain affected your mood: 2/10  During the past 24 hours, how has pain contributed to your stress: 2/10    Relevant Diagnostic Imaging/Testing:    none    SUBJECTIVE:  The patient is a pleasant 30year old female that presents to our office today for an evaluation and treatment of low back pain. Patient rates her pain today at 4/10 with 10 being worst. Patient states she is feeling pretty good. Her back still hurts with long periods of sitting and standing. OBJECTIVE FINDINGS:     Problem focus examination revealed:    (Thoracic spine) Thoracic spine facet joint function is within normal limits except for her T 9/10/11 facet joints that exhibited limited passive range of motion and segmental restriction and tenderness upon palpation; The following muscles were examined for normal flexibility and tone; right and left rhomboid muscle; right and left serratus muscle; left and right latissimus dorsi muscle; These muscles were within normal limits.     (Lumbar, Sacral and Patient was alert and oriented x3. Pt on 2L NC. Pt had CT of abdomen and pelvis . Pt on IV lasix., IV Rocephin, and IV Azithromycin. Call light within reach. Bed in the lowest position. Will continue to monitor.   Problem: Patient Centered Care  Goal: Bridgett Pelvic Spine)Lumbar spine facet joint function is within normal limits. Sacroiliac joint function is within normal limits. The following muscles were examined for flexibility and tone at rest; right and left hip flexor muscle; right and left hip adductor muscle; right and left hip rotator muscle; right and left piriformis muscle; right and left hamstring muscle; right and left Gluteus medius muscle and gluteus alvaro muscle; right and left quadratus lumborum muscle; left and right Tensor fasciae latae muscle; left and right internal hip rotators muscle; right and left quadriceps muscle. These muscles were found to be within normal limits except for her right and left lumbar parspinal muscle and right quadratus lumborum(QL) muscle that exhibited limited flexibility and were hypertonic at rest.    (Hip) Range of motion is within normal limits. Orthopedic/Neurological tests:  None today    Assessment:   1. Segmental and somatic dysfunction of lumbar region    2. Acute right-sided low back pain without sciatica    3. Segmental and somatic dysfunction of pelvic region    4. Segmental and somatic dysfunction of sacral region    5. Pain in thoracic spine    6. Segmental and somatic dysfunction of thoracic region        Complicating Factors/Co-morbidities:   Anxiety and depression    Plan:  Patient was evaluated and then treated with manipulation to her thoracic spine via diversified technique and to her lumbar and sacral spine via Dang distraction technique to improve function and passive range of motion of facet joints. Patient also treated with contract/relax stretch to muscle noted as taut in objective findings to improve flexibility and decrease strain to spinal structures. Patient also treated with lumbar distraction x 5 minutes to stretch lumbar paraspinal muscle. and centralize possible contained migration of lumbar intervertebral nucleus.     Therapeutic Exercise/Rehab/Modalities performed today:   None activity and pre-medicate as appropriate  Outcome: Progressing     Problem: SAFETY ADULT - FALL  Goal: Free from fall injury  Description: INTERVENTIONS:  - Assess pt frequently for physical needs  - Identify cognitive and physical deficits and behaviors t Minimize distractions  - Allow time for understanding and response  - Establish method for patient to ask for assistance (call light)  - Provide an  as needed  - Communicate barriers and strategies to overcome with those who interact with patien today    Patient Instruction/Education:   Patient educated in the nature of condition and likely pain generators as well as plan of care to resolve symptoms and improve muscular and skeletal function. Patient noted verbal understanding of condition and is agreeable to plan of care. Patient stated understanding of, and was in agreement with, the discussed instructions. Goals of Care: Goal of care is to improve muscular and skeletal function and provide symptom relief. Care is planned at 2-3 x/wk x 2-3 weeks dependent on patient response to care. Other treatment options discussed with patient  None today    Patient rates her pain post treatment at 4/10 with 10 being worst.    Patient is to return Friday for continued care and treatment of her condition consistent with plan of care. Length of Visit: 15 min. On 3/22/2019, MARIA DEL CARMEN Hammer scribed the services personally performed by Georges River DC       The documentation recorded by the scribe accurately and completely reflects the service(s) I personally performed and the decisions made by me.

## 2021-10-29 NOTE — CDS QUERY
Potential for Impaired Nutritional Status  DOCUMENTATION CLARIFICATION FORM  Dear Doctor:  Clinical information suggests potential for impaired nutritional status.  For accurate ICD-10-CM code assignment to reflect severity of illness and risk of mortality, resulting in anorexia or diminished intake as evidenced by <505 of usual intake >1 month and 14#(8%) weight loss over month.      ESTIMATED NUTRITION NEEDS: Dosing wt: 73.9 kg  Calories: 4198-1537 calories/day (25-30 calories per kg Current wt)  Protein: 88 Marginal, added oral nutrition supplements (ONS)        Percent Meals Eaten (last 3 days)      Date/Time Percent Meals Eaten (%)     10/26/21 1429 75 %     10/26/21 1830 75 %          Food Allergies:  No known food allergies.   Cultural/Ethnic/Advent Pre have any questions, please contact Clinical :  Madiha Licona at [phone]     Thank You!     THIS FORM IS A PERMANENT PART OF THE MEDICAL RECORD

## 2021-10-29 NOTE — PROGRESS NOTES
[facility]    Progress Note    Akbar Murdock Patient Status:  Inpatient    1950 MRN [id number]   Location [facility] 3W/SW Attending Nel Perez MD   [address] Day # 3 PCP Darlyn Nix MD     Subjective:     Unable PHOS 3.6 10/29/2021    TROP <0.045 10/26/2021       CT ABDOMEN+PELVIS(ALL W+WO)(CPT=74178)    Result Date: 10/28/2021  CONCLUSION:   Multiple low-attenuation lesions within the right left hepatic lobes, the largest measuring up to 1.7 cm within the left he

## 2021-10-29 NOTE — PROGRESS NOTES
[facility]OSP - [facility]    Cardiology Progress Note    Shannanoliverio Rucker Patient Status:  Inpatient    1950 MRN [id number]   Location [facility] 3W/SW Attending Stephen Scanlon MD   [facility] Day # 3 PCP Darlyn Nix MD     Subjective Continuous Infusions:     Results:     Lab Results   Component Value Date    WBC 10.8 10/29/2021    HGB 13.7 10/29/2021    HCT 41.0 10/29/2021    .0 10/29/2021    CREATSERUM 0.49 (L) 10/29/2021    BUN 15 10/29/2021     (L) 10/29/2021 for osseous metastases. Left lower lobe pulmonary malignancy with a large right pleural effusion and near complete atelectasis of the right lower lobe, partially seen and better characterized on CT chest performed 10/27/2021.        Dictated by (CST): Kristin Pedersen physician:               Demario Argueta MD Sonographer: Burt Carrillo  CC: Cherelle Figueroa CC: Arcenio Leticia  ------------------------------------------------------------------- Study Conclusions 1. Left ventricle:  The cavity size was blanca was 75-80%, by biplane method of disks. Wall motion was normal; there were no regional wall motion abnormalities. Doppler parameters are consistent with abnormal left ventricular relaxation (grade 1 diastolic dysfunction).  Aortic valve:  Not well visualize ratio, ED            1.01           ---------- ---------  LV end-diastolic               73    ml       ---------- 62 - 150  volume, 2-p  LV end-systolic        (L)     17    ml       ---------- 21 - 61  volume, 2-p  LV ejection fraction,  (H)     77    % ---------- ---------  peak velocity  Velocity ratio, mean,          0.53           ---------- ---------  LVOT/AV   Aorta                          Value          07/13/2018 Reference  Aortic root ID                 3.2   cm       ---------- &lt;4.0   Left a - S/P right-sided thoracentesis 10/26, 1000 mill bloody fluid exudative prelim malignancy. Per hematology, likely metastatic given malignant effusion. CT abdomen/pelvis with IV contrast ordered. Possible pleurx prior to discharge.  Awaiting pathology repo

## 2021-10-29 NOTE — PROGRESS NOTES
[facility]    Progress Note    Astrid Castellano Patient Status:  Inpatient    1950 MRN [id number]   Location [facility] 3W/SW Attending Bruno Franco MD   [facility] Day # 3 PCP Darlyn Nix MD       Subjective:   Avani Medina abnormalities noted  Musculoskeletal: full ROM all extremities good strength  no deformities  Extremities: no edema, cyanosis  Neurological:  Grossly normal    Results:     Laboratory Data:  Lab Results   Component Value Date    WBC 10.8 10/29/2021    HGB are down    Follow labs    10/29/2021  Bolivar Garcia MD

## 2021-10-29 NOTE — DIETARY NOTE
ADULT NUTRITION UPDATE NOTE      RECOMMENDATIONS TO MD:  Nutrition Support (tube feeding) is indicated if in accordance with pt/family goc/wishes and See Nutrition Intervention    ADMITTING DIAGNOSIS:   Pleural effusion,  Dyspnea, unspecified type,  Acute Supplements-RD added Patient declined. Rational/use of oral supplements discussed.   - Vitamin and mineral supplements: none  - Feeding assistance: meal set up and family orders for patient  - Nutrition education: assess education needs  - Coordination of n > = values in this interval not displayed.      MONITOR AND EVALUATE/NUTRITION GOALS:  - Food and Nutrient Intake:      Monitor: adequacy of PO intake and tolerance of PO intake   - Food and Nutrient Administration:      Monitor: need for temporary enteral

## 2021-10-29 NOTE — PROGRESS NOTES
Attempted OT tx. Pt off unit for MRI and was sedated for test. Pt not appropriate for tx for today. Will reattempt as able.     Tali Sosa   Occupational Therapist  [address] [place] [address]

## 2021-10-29 NOTE — PLAN OF CARE
Patient with increased confusion over night with wife at bedside. Found with telemetry wires and nasal cannula removed sitting on the side of the bed with his eyes closed swaying.  He was moved from the bed to the reclining chair (chair alarm activated) in INTERVENTIONS:  - Encourage pt to monitor pain and request assistance  - Assess pain using appropriate pain scale  - Administer analgesics based on type and severity of pain and evaluate response  - Implement non-pharmacological measures as appropriate and preferences of patient/family/discharge partner  - Complete POLST form as appropriate  - Assess patient's ability to be responsible for managing their own health  - Refer to Case Management Department for coordinating discharge planning if the patient need

## 2021-10-29 NOTE — PHYSICAL THERAPY NOTE
Attempt x 1 today . Pt at MRI for extended period this PM . Pt was sedated for testing . PT unable to see pt today due to off unit .

## 2021-10-29 NOTE — PLAN OF CARE
Patient with family present at bedside. Satting well on 2L. Pt was NPO this morning in anticipation of placement of pleurx catheter. Pulm stated pt will go for procedure on Monday.   Consent not signed yet, family wants to go over procedure with physician f anxiety  - Utilize distraction and/or relaxation techniques  - Monitor for opioid side effects  - Notify MD/LIP if interventions unsuccessful or patient reports new pain  - Anticipate increased pain with activity and pre-medicate as appropriate  Outcome: P Assess communication ability and preferred communication style  - Implement communication aides and strategies  - Use visual cues when possible  - Listen attentively, be patient, do not interrupt  - Minimize distractions  - Allow time for understanding and

## 2021-10-29 NOTE — PROGRESS NOTES
[facility]OSP - [facility]    Hematology/Oncology   Progress Note        Chief complaint  benny cancer  Pleural effusions    Subjective:  Persistent mild confusion, no BM x 3 days; abd discomfort, also reports lack of sleeping.  Son at bedside    Assessment 10/27/2021  CONCLUSION:  1. Partially necrotic 3.7 cm central left lower lobe/left infrahilar lung mass. This finding is highly concerning for a primary bronchogenic carcinoma. No visible thoracic lymphadenopathy/metastases.   2. Findings most consistent Intravenous, Q24H  azithromycin (ZITHROMAX) 500 mg in sodium chloride 0.9% 250 mL IVPB, 500 mg, Intravenous, Q24H  aspirin EC tab 81 mg, 81 mg, Oral, Daily  atorvastatin (LIPITOR) tab 20 mg, 20 mg, Oral, Nightly  carBAMazepine (TEGRETOL) chewable tab 100 m patient's care. Should you have any questions please call. We will continue to follow along.      Lidia Small MD

## 2021-10-29 NOTE — PROGRESS NOTES
[facility]    Progress Note    Skyla Arreaga Patient Status:  Inpatient    1950 MRN [id number]   Location [facility] 3W/SW Attending Bruno Quinn MD   [address] Day # 3 PCP Darlyn Nix MD     Subjective:   Griffin Harrison W+WO)(CPT=74178)    Result Date: 10/28/2021  CONCLUSION:   Multiple low-attenuation lesions within the right left hepatic lobes, the largest measuring up to 1.7 cm within the left hepatic lobe are suspicious for hepatic metastases.  Dedicated nonemergent li consult, appreciate recs  -CT of the abdomen pelvis reviewed findings consistent with hepatic mets as well as osseous mets.   Discussed with patient's son at bedside  -WBCs BC stable patient is currently afebrile  -plan for MRI brain today  -pleurex cathete

## 2021-10-29 NOTE — CM/SW NOTE
10/29/21 1000   / Referral Data   Referral Source Social Work (self-referral)   Reason for Referral Discharge planning   Informant Patient; Son      Method of Interpretation Family Member over the age of 25    Translated To [facility] non ambulatory you can use \"with exertion\" such as during a transfer to assess their O2 level)    SW/CM will continue to follow and assist.      Yana Cyr, [facility] ext [phone]

## 2021-10-30 NOTE — PLAN OF CARE
Patient did not sleep well over night despite administration of ativan PO. He was restless with moderate confusion. Wife remained at bedside over night. MRI of the brain results are pending. Plan for pleurex drain placement Monday.     Problem: Patient Cent Instruct pt to call for assistance with activity based on assessment  - Modify environment to reduce risk of injury  - Provide assistive devices as appropriate  - Consider OT/PT consult to assist with strengthening/mobility  - Encourage toileting schedule EKG if indicated  - Evaluate effectiveness of antiarrhythmic and heart rate control medications as ordered  - Initiate emergency measures for life threatening arrhythmias  - Monitor electrolytes and administer replacement therapy as ordered  Outcome: Progr

## 2021-10-30 NOTE — PLAN OF CARE
Patient received from CV. Alert, orientation fluctuating, use of family for interpretation. 2L oxygen, denies shortness of breath/chest pain. Voiding. Rocephin given. Seizure precautions in place. Low appetite, ACHS, denies nausea, encouraged to eat.  Up wi including physical limitations  - Instruct pt to call for assistance with activity based on assessment  - Modify environment to reduce risk of injury  - Provide assistive devices as appropriate  - Consider OT/PT consult to assist with strengthening/mobilit stability  Description: INTERVENTIONS:  - Monitor vital signs, rhythm, and trends  - Monitor for bleeding, hypotension and signs of decreased cardiac output  - Evaluate effectiveness of vasoactive medications to optimize hemodynamic stability  - Monitor ar

## 2021-10-30 NOTE — PROGRESS NOTES
[facility]    Progress Note      Assessment and Plan:   1. Status post large malignant right-sided pleural effusion    Recommendations: Await Pleurx catheter on Monday.     2.  Metastatic lung cancer involving bone pleura and liver    Recom

## 2021-10-30 NOTE — PLAN OF CARE
Patient seen by oncology this morning, aspirin on hold for pleuraX catheter insertion on Monday. Consent to be signed. Patient has been up out of bed throughout the morning. Ate breakfast. Mri results pending. Patient to be transferred to [facility] to room 462. unsuccessful or patient reports new pain  - Anticipate increased pain with activity and pre-medicate as appropriate  Outcome: Progressing     Problem: SAFETY ADULT - FALL  Goal: Free from fall injury  Description: INTERVENTIONS:  - Assess pt frequently for cues when possible  - Listen attentively, be patient, do not interrupt  - Minimize distractions  - Allow time for understanding and response  - Establish method for patient to ask for assistance (call light)  - Provide an  as needed  - [employer]

## 2021-10-30 NOTE — PHYSICAL THERAPY NOTE
PHYSICAL THERAPY TREATMENT NOTE - INPATIENT     Room Number: 462/462-A       Presenting Problem:  (lung CA dyspnea)    Problem List  Principal Problem:    Dyspnea, unspecified type  Active Problems:    Essential hypertension    Hyponatremia    Dyspnea    P Static Sitting: Fair +  Dynamic Sitting: Fair           Static Standing: Fair -  Dynamic Standing: Fair -    ACTIVITY TOLERANCE                         O2 WALK       AM-PAC '6-Clicks' INPATIENT SHORT FORM - BASIC MOBILITY  How much difficulty rolling at assistance level: modified independent   Goal #3   Current Status amb 25 ft x3 with RW CGA   Goal #4 Patient will negotiate 4 stairs/one curb w/ assistive device and supervision   Goal #4   Current Status NT   Goal #5 Patient to demonstrate inde

## 2021-10-30 NOTE — PROGRESS NOTES
[facility]    Hematology/Oncology   Progress Note        Chief complaint  benny cancer  Pleural effusions    Subjective:  Persistent mild confusion, a lot of agitation last night, got maybe 1-2 hrs sleep this is after getting ativan, family right   Abdomen: Abdomen is soft but distended,  unchanged  Neurologic: Able to follow commands, able to move his upper and lower extremity no obvious deficits noted  Extremities: Bilateral leg edema that appears to be 1+ symmetric  Psychiatric: more tired 5-325 MG per tab 2 tablet, 2 tablet, Oral, Q4H PRN  Insulin Aspart Pen (NOVOLOG) 100 UNIT/ML flexpen 1-7 Units, 1-7 Units, Subcutaneous, TID CC  losartan (COZAAR) tab 50 mg, 50 mg, Oral, Daily  melatonin cap/tab 5 mg, 5 mg, Oral, Nightly        Labs: asses

## 2021-10-30 NOTE — PROGRESS NOTES
[facility] - [facility]    Progress Note    Luh Alfonso Patient Status:  Inpatient    1950 MRN [id number]   Location [facility] 3W/SW Attending Marilee Mixon MD   [address] Day # 4 PCP Darlyn Nix MD     Subjective:   Irwin Chung No results found.         Assessment & Plan:        Shortness of breath  -Likely secondary hypervolemia  -Patient also noted to have large right-sided pleural effusion  Differential diagnosis include acute CHF exacerbation, malignancy, or infection  -

## 2021-10-30 NOTE — PROGRESS NOTES
[facility]    Progress Note      Subjective:      Son at beside - state confusion better     State breathing fluctuates - sometime more sob     Review of Systems:     Constitutional: fatigue and weight loss   Eyes: negative for irritation, Sodium (ROCEPHIN) 1 g in sodium chloride 0.9% 100 mL IVPB-ADDV, 1 g, Intravenous, Q24H  aspirin EC tab 81 mg, 81 mg, Oral, Daily  atorvastatin (LIPITOR) tab 20 mg, 20 mg, Oral, Nightly  carBAMazepine (TEGRETOL) chewable tab 100 mg, 100 mg, Oral, Daily  car --    *   < > 128*   < > 130* 134* 133*   K 4.4   < > 4.1  --  3.9 3.9  --    CL 91*   < > 90*   < > 92* 93* 94*   CO2 26.0   < > 32.0   < > 30.0 35.0* 34.0*   ALKPHO  --   --  72  --   --   --   --    AST  --   --  18  --   --   --   --    ALT  -- Effusion likely malignant  - IV diuretics changed to po BID - bicarb elevated from contraction alkalosis - will assess to reduce dose of lasix   - Echo with EF 75-80% with grad I DD and no WMA    4.  Metastatic cancer: likely lung the primary   - CTAP with

## 2021-10-31 NOTE — PROGRESS NOTES
[facility]    Progress Note      Subjective:     Daughter at beside - sleepy most of the day     Appetite poor     State breathing fluctuates - feels better now - stable NC at 2-3 liters    Review of Systems:     Constitutional: fatigue a PRN  magnesium oxide (MAG-OX) tab 400 mg, 400 mg, Oral, Once  zolpidem (AMBIEN) tab 5 mg, 5 mg, Oral, Nightly PRN  cefTRIAXone Sodium (ROCEPHIN) 1 g in sodium chloride 0.9% 100 mL IVPB-ADDV, 1 g, Intravenous, Q24H  [Held by provider] aspirin EC tab 81 mg, 0.36*   < > 0.49* 0.58*  --  0.49*   GFRAA 132   < > 125   < > 142   < > 125 117  --  125   GFRNAA 114   < > 108   < > 123   < > 108 101  --  108   CA 9.1   < > 9.2   < > 9.0   < > 9.6 9.5  --  9.5   ALB 2.9*  --  2.4*  --  2.2*  --   --   --   --   -- history of smoking high likelyhood of malignancy   - pleural fluid LDH and protein noted for exudative effusion  - pleurex catheter placement on Monday      3.  BLE edema / swelling  - chronic with worsening in last few weeks- improve with diuretics   - hyp

## 2021-10-31 NOTE — PROGRESS NOTES
[facility]    Progress Note    Kailyn Matias Patient Status:  Inpatient    1950 MRN [id number]   Location [facility] 3W/SW Attending Hilaria Wilkinson MD   [facility] Day # 5 PCP Darlyn Nix MD     Subjective:   Vinnie Ludwig 10/26/2021       No results found.         Assessment & Plan:        Shortness of breath  -Likely secondary hypervolemia  -Patient also noted to have large right-sided pleural effusion  Differential diagnosis include acute CHF exacerbation, malignancy, or i

## 2021-10-31 NOTE — PROGRESS NOTES
[facility]    Progress Note    Akbar Murdock Patient Status:  Inpatient    1950 MRN [id number]   Location [facility] 4W/SW/SE Attending Nel Perez MD   [facility] Day # 5 PCP Darlyn Nix MD     Subjective:     Con 10/27/2021    PSA 2.1 03/24/2017    DDIMER <0.27 07/02/2018    ESRML 27 (H) 09/24/2021    MG 2.2 10/30/2021    PHOS 3.8 10/30/2021    TROP <0.045 10/26/2021       No results found.         Assessment & Plan:     1- Metastatic Lung cancer / new diagnosis   l

## 2021-10-31 NOTE — PLAN OF CARE
Patient alert, orientation fluctuating, use of family for interpretation. Weaned to 2L oxygen, denies shortness of breath/chest pain. Voiding. Rocephin given. Seizure precautions in place. Low appetite, ACHS, denies nausea, encouraged to eat.  Up with adonay Pt complaining of SOB. Noted increase in expiratory wheezes safety including physical limitations  - Instruct pt to call for assistance with activity based on assessment  - Modify environment to reduce risk of injury  - Provide assistive devices as appropriate  - Consider OT/PT consult to assist with strengthening/ stability  Description: INTERVENTIONS:  - Monitor vital signs, rhythm, and trends  - Monitor for bleeding, hypotension and signs of decreased cardiac output  - Evaluate effectiveness of vasoactive medications to optimize hemodynamic stability  - Monitor ar

## 2021-10-31 NOTE — PLAN OF CARE
Patient is alert, oriented to place and situation, disoriented to time during evening shift assessment. Family member at bedside staying overnight assisting with translation and reorientation. Calm overnight. Vital signs stable on 2L O2.   Receiving IV R evaluate response  - Implement non-pharmacological measures as appropriate and evaluate response  - Consider cultural and social influences on pain and pain management  - Manage/alleviate anxiety  - Utilize distraction and/or relaxation techniques  - Monit support system  Outcome: Progressing     Problem: Altered Communication/Language Barrier  Goal: Patient/Family is able to understand and participate in their care  Description: Interventions:  - Assess communication ability and preferred communication styl

## 2021-11-01 NOTE — CM/SW NOTE
MDO Hospice    SW called and notified [facility]. Referral sent through Aidin.      MICHELE Dunbar, [facility]    [id number]

## 2021-11-01 NOTE — PROGRESS NOTES
Urogram was canceled by me per wife Cathys request due to pt in hospital at this time.  The procedure was due on November 2

## 2021-11-01 NOTE — DIETARY NOTE
ADULT NUTRITION REASSESSMENT      RECOMMENDATIONS TO MD:  Nutrition Support (tube feeding) is indicated if in accordance with pt/family goc/wishes and See Nutrition Intervention    ADMITTING DIAGNOSIS:   Pleural effusion,  Dyspnea, unspecified type,  Acute per kg Current wt)  Protein: 88 grams protein/day (1.2 grams protein per kg Current wt)    NUTRITION INTERVENTION:  - Diet: Regular/General and Fluid Restriction 1500 ml  - RD Malnutrition Care Plan: Encouraged increased PO intake, Initiated ONS (oral nutr was discarded before i saw at 10/29/21 0900   Percent Meals Eaten (%): ate food from outside  at 10/30/21 0900        MEDICATIONS: reviewed     LABS: reviewed  Recent Labs     10/29/21  0704 10/29/21  0704 10/30/21  0707 10/30/21  0820 10/31/21  0702 11/01

## 2021-11-01 NOTE — PROGRESS NOTES
[facility]    Hematology/Oncology   Progress Note        Chief complaint  benny cancer  Pleural effusions    Subjective:  Confusion persists, more weak, significant agitation, got better on Haldol.  Janette Reyes is requesting hospice discussions, PRN  cefTRIAXone Sodium (ROCEPHIN) 1 g in sodium chloride 0.9% 100 mL IVPB-ADDV, 1 g, Intravenous, Q24H  [Held by provider] aspirin EC tab 81 mg, 81 mg, Oral, Daily  atorvastatin (LIPITOR) tab 20 mg, 20 mg, Oral, Nightly  carBAMazepine (TEGRETOL) chewable

## 2021-11-01 NOTE — PHYSICAL THERAPY NOTE
PHYSICAL THERAPY TREATMENT NOTE - INPATIENT     Room Number: 462/462-A       Presenting Problem:  (lung CA dyspnea)    Problem List  Principal Problem:    Dyspnea, unspecified type  Active Problems:    Essential hypertension    Hyponatremia    Dyspnea    P RESTRICTION  Weight Bearing Restriction: None                PAIN ASSESSMENT   Rating: Unable to rate          BALANCE                                                                                                                       Static Sitting: [employer] independent   Goal #3   Current Status amb 5 ft with RW mod a   Goal #4 Patient will negotiate 4 stairs/one curb w/ assistive device and supervision   Goal #4   Current Status NT   Goal #5 Patient to demonstrate independence with home activity/exercise ins

## 2021-11-01 NOTE — HOSPICE RN NOTE
[facility] nursing visit for follow up on hospice consult order. Spouse Tona at bedside. Introduced as [facility]. Tyler Vyas does not want to discuss hospice at this time. Patient waiting to be taken to IR for Pleurx placement.  This

## 2021-11-01 NOTE — BRIEF PROCEDURE NOTE
[facility]  Procedure Note    Denise Lainez Patient Status:  Inpatient    1950 MRN [id number]   Location [facility] 4W/SW/SE Attending Alee Hewitt MD   [facility] Day # 6 PCP Darlyn Nix MD     Procedure:  Merlin Shaffer

## 2021-11-01 NOTE — HOSPICE RN NOTE
[facility] nursing visit for follow up on hospice consult. Meeting was scheduled for hospice discussion at 6 pm. Patient was just taken to IR for Pleurx placement. Will return in about 1 hr to meet with spouse.

## 2021-11-01 NOTE — PROGRESS NOTES
[facility]  Nephrology Daily Progress Note    Alexx Kent  [id number]  66year old      HPI:   Alexx Kent is a 66year old male. Intermittently confused as per family. Scheduled for pleurex tube today.    Family to meet wi for age reflexes and motor skills appropriate for age    Labs:  Lab Results   Component Value Date    WBC 10.7 11/01/2021    HGB 13.4 11/01/2021    HCT 41.6 11/01/2021    .0 11/01/2021    CREATSERUM 0.48 11/01/2021    BUN 17 11/01/2021     11/ acute infarct or hemorrhage. 2. Mild changes of chronic small vessel disease in cerebral white matter-age expected. 3. Post-contrast images were limited by patient related motion artifact.     Dictated by (CST): Mago Romo MD on 10/29/2021 at 3:39 PM (NITROSTAT) SL tab 0.4 mg, 0.4 mg, Sublingual, Q5 Min PRN  •  acetaminophen (TYLENOL) tab 650 mg, 650 mg, Oral, Q4H PRN **OR** HYDROcodone-acetaminophen (NORCO) 5-325 MG per tab 1 tablet, 1 tablet, Oral, Q4H PRN **OR** HYDROcodone-acetaminophen (NORCO) 5-3

## 2021-11-01 NOTE — PLAN OF CARE
Waiting all day for IR Pleurx, multiple calls to IR, family irate. Leaving unit now for pleurx to IR department. No change in mentation remains confused, restless at times, incontinent and voids per urinal. Maintained NPO for pleurx.   SL. Hospice is the pl Manage/alleviate anxiety  - Utilize distraction and/or relaxation techniques  - Monitor for opioid side effects  - Notify MD/LIP if interventions unsuccessful or patient reports new pain  - Anticipate increased pain with activity and pre-medicate as approp Monitor vital signs, rhythm, and trends  - Monitor for bleeding, hypotension and signs of decreased cardiac output  - Evaluate effectiveness of vasoactive medications to optimize hemodynamic stability  - Monitor arterial and/or venous puncture sites for bl

## 2021-11-01 NOTE — PROGRESS NOTES
[facility]    Progress Note    Carmelita Maher Patient Status:  Inpatient    1950 MRN [id number]   Location [facility] 3W/SW Attending Tucker Benitez MD   [facility] Day # 6 PCP Darlyn Nix MD     Subjective:   Harpreet Woodward 11/01/2021    TROP <0.045 10/26/2021       No results found.         Assessment & Plan:        Shortness of breath  -Likely secondary hypervolemia  -Patient also noted to have large right-sided pleural effusion  Differential diagnosis include acute CHF exac

## 2021-11-01 NOTE — HOSPICE RN NOTE
[facility] consult order received. Aidin sent. Hospice liaison will follow up with family for hospice discussion.

## 2021-11-01 NOTE — PROGRESS NOTES
[facility]    Progress Note    Saad Ordoñez Patient Status:  Inpatient    1950 MRN [id number]   Location [facility] 4W/SW/SE Attending Analy Gordon MD   [address] Day # 6 PCP Darlyn Nix MD     Subjective:     Con (H) 09/24/2021    MG 2.1 11/01/2021    PHOS 3.6 11/01/2021    TROP <0.045 10/26/2021       No results found.         Assessment & Plan:     1- Metastatic malignancy / malignant pleural effusion  ( new diagnosis )  Final path for right pleural effusion repor

## 2021-11-02 NOTE — PLAN OF CARE
Pt lethargic and confused at baseline. 3.5L of O2. Poor appetite, accu-checks AC/HS, Pleurx drain in place; dressing C/D/I. Pt to be dc'd home today with hospice; transported by ambulance. Educated family on dc. Answered all questions.  Fall precautions in techniques  - Monitor for opioid side effects  - Notify MD/LIP if interventions unsuccessful or patient reports new pain  - Anticipate increased pain with activity and pre-medicate as appropriate  Outcome: Adequate for Discharge     Problem: SAFETY ADULT - communication ability and preferred communication style  - Implement communication aides and strategies  - Use visual cues when possible  - Listen attentively, be patient, do not interrupt  - Minimize distractions  - Allow time for understanding and respon

## 2021-11-02 NOTE — PROGRESS NOTES
[facility]    Progress Note    Brittany Orantes Patient Status:  Inpatient    1950 MRN [id number]   Location [facility] 4W/SW/SE Attending Gretchen Santiago MD   [facility] Day # 7 PCP Darlyn Nix MD     Subjective:   Jonnathan Pathak status   Family decided for hospice at Home       2- COPD   Lifelong h/o smoking   Neb / O2     3-toxic and metabolic encephalopathy  Supportive care    4- DVT prophylaxis   Heparin subcutaneous                 Mihir Chino MD  11/2/2021

## 2021-11-02 NOTE — DISCHARGE SUMMARY
Hospital Discharge Diagnoses: Dyspnea    Lace+ Score: 82  59-90 High Risk  29-58 Medium Risk  0-28   Low Risk. TCM Follow-Up Recommendation:  LACE < 29: Low Risk of readmission after discharge from the hospital; Still recommend for TCM follow-up.

## 2021-11-02 NOTE — PROGRESS NOTES
[facility]    Hematology/Oncology   Progress Note        Chief complaint  benny cancer  Pleural effusions    Subjective:  Comfortable in bed, awake, wife at bedside. Assessment and Plan:  1.  Metastatic lung cancer to the bone, pleura an Or  glucose-vitamin C (DEX-4) chewable tab 4 tablet, 4 tablet, Oral, Q15 Min PRN   Or  dextrose 50 % injection 50 mL, 50 mL, Intravenous, Q15 Min PRN   Or  glucose (DEX4) oral liquid 30 g, 30 g, Oral, Q15 Min PRN   Or  glucose-vitamin C (DEX-4) chewable ta

## 2021-11-02 NOTE — HOSPICE RN NOTE
[facility] nursing visit for follow up on hospice consult order. Patient has returned from IR and Pleurx placement RUQ. Met with spouse and daughter at bedside.  Discussed hospice benefit, hospice philosophy, palliative care with questions and conc

## 2021-11-02 NOTE — HOSPICE RN NOTE
[facility] spoke with patient and his family at bedside. Patient to be discharged to home and get admitted at his sons home in [place]. He will be Routine  Level of Care at home. Superior to  at  [facility]. POC discussed with Chaya FERNANDEZ.

## 2021-11-02 NOTE — PLAN OF CARE
PRN Haldol for agitation. Denied pain. Alert to self. On 4L O2. Incontinent at times - urinal. Incontinent care provided - depends and bed pad changed. R-Pleurx drain. Wife remained at bedside.    Plan for possible discharge home with hospice -  to reasse and participate in their care  Description: Interventions:  - Assess communication ability and preferred communication style  - Implement communication aides and strategies  - Use visual cues when possible  - Listen attentively, be patient, do not interrup RN  Outcome: Progressing

## 2021-11-04 NOTE — DISCHARGE SUMMARY
[facility]    PATIENT'S NAME: Mckinley Yepez   ATTENDING PHYSICIAN: Shemar Carias MD   PATIENT ACCOUNT#:   [id number]    LOCATION:  [address] [place] [address] #:   [id number]       YOB: 1950  ADMISSION DATE:       1 Pupils equal, round, and reactive to light and accommodation. Atraumatic, normocephalic. CARDIAC:  S1, S2 appreciated. LUNGS:  Good air entry in upper airways. Bibasilar crackles appreciated. ABDOMEN:  Soft, nontender, nondistended.   Positive bowel so

## 2021-11-16 NOTE — PROGRESS NOTES
Attempted to reach the patient to complete a Hospital FU call. Left a message, with the assistance of the language line, for the pt to call the NCM back at, [phone].

## 2021-11-17 NOTE — PROGRESS NOTES
ALFONSO placed call with the assistance of [facility] : Janene Goddard #[id number]. LM requesting a call back to [phone] with a condition update.

## 2021-12-09 ENCOUNTER — TELEPHONE (OUTPATIENT)
Dept: HEMATOLOGY/ONCOLOGY | Facility: HOSPITAL | Age: 71
End: 2021-12-09

## 2021-12-09 NOTE — TELEPHONE ENCOUNTER
SW received notification of the pt's death from [facility] liaison while under home hospice services. Nurse note copied below.     Tino -[phone], see Dee [phone] ([email or web address]

## 2022-12-01 NOTE — PLAN OF CARE
Patient is alert and oriented x4, but forgetful at times. Patient is anxious at times and was impulsive when he first arrived to the floor. Family at bedside. Right side thoracentesis with 1 Liter of pleural fluid removed. Sample sent to lab.  Patient is on Manage/alleviate anxiety  - Utilize distraction and/or relaxation techniques  - Monitor for opioid side effects  - Notify MD/LIP if interventions unsuccessful or patient reports new pain  - Anticipate increased pain with activity and pre-medicate as approp Interventions:  - Assess communication ability and preferred communication style  - Implement communication aides and strategies  - Use visual cues when possible  - Listen attentively, be patient, do not interrupt  - Minimize distractions  - Allow time for Hide Additional Notes?: No Detail Level: Zone

## 2023-05-01 NOTE — OCCUPATIONAL THERAPY NOTE
OCCUPATIONAL THERAPY EVALUATION - INPATIENT     Room Number: 325/325-A  Evaluation Date: 10/27/2021  Type of Evaluation: Initial  Presenting Problem: dyspnea    Physician Order: IP Consult to Occupational Therapy  Reason for Therapy: ADL/IADL Dysfunction a Writer reached out to Sandra to schedule a follow up with Dr. Cevallos after her 5/11 Pet CT. Sandra was unable to schedule at the time of the call. Sandra stated she would call us back later. Please assist in scheduling when Sandra calls back. Thank you.    unspecified type  Active Problems:    Essential hypertension    Hyponatremia    Dyspnea    Pleural effusion    Acute on chronic congestive heart failure, unspecified heart failure type [facility])      Past Medical History  Past Medical History:   Diagnosis Date need…  -   Putting on and taking off regular lower body clothing?: A Lot  -   Bathing (including washing, rinsing, drying)?: A Little  -   Toileting, which includes using toilet, bedpan or urinal? : A Little  -   Putting on and taking off regular upper bod

## (undated) DEVICE — LINE MNTR ADLT SET O2 INTMD

## (undated) DEVICE — Device: Brand: DEFENDO AIR/WATER/SUCTION AND BIOPSY VALVE

## (undated) DEVICE — ENDOSCOPY PACK - LOWER: Brand: MEDLINE INDUSTRIES, INC.

## (undated) DEVICE — SNARE CAPTIFLEX MICRO-OVL OLY

## (undated) DEVICE — SNARE OPTMZ PLPCTM TRP

## (undated) DEVICE — STERILE LATEX POWDER-FREE SURGICAL GLOVESWITH NITRILE COATING: Brand: PROTEXIS

## (undated) NOTE — LETTER
[address], [address], [address] [facility]  [address] [facility], [place] [address]  [phone]        Dear Kiya Chambers. MD Boyd,      I had the pleasure of seeing your patient, Néstor Oviedo on 4/3/2018.      Below p Disp: 90 tablet Rfl: 3   carBAMazepine 200 MG Oral Tab TAKE ONE TABLET BY MOUTH TWICE A DAY Disp: 180 tablet Rfl: 3   aspirin 81 MG Oral Tab EC Take 1 tablet (81 mg total) by mouth daily.  Disp: 90 tablet Rfl: 1      Past Medical History:   Diagnosis Date GI: denies nausea, vomiting, constipation, diarrhea; no heartburn  GENITAL/: no dysuria, urgency or frequency; no nocturia  MUSCULOSKELETAL: no joint complaints upper or lower extremities  PSYCHE:no depression or anxiety  NEURO: As in HPI    EXAM:     Vi hydrochlorothiazide 25 MG Oral Tab Take 1 tablet (25 mg total) by mouth every morning.  With banana or orange juice Disp: 90 tablet Rfl: 3   Calcium Citrate-Vitamin D 1000-400 Oral Liquid  Disp:  Rfl:    Multiple Vitamins-Minerals (ANTIOXIDANT) Oral Ole Tom

## (undated) NOTE — IP AVS SNAPSHOT
[facility]            (For Outpatient Use Only) Initial Admit Date: 10/26/2021   Inpt/Obs Admit Date: Inpt: 10/26/21 / Obs: N/A   Discharge Date:    Prasanna Ridley:  [de-identified]   MRN: [de-identified]   CSN: [id number]   CEID: [id number]        E Rel to Subscriber:    Hospital Account Financial Class: Medicare Advantage    November 2, 2021

## (undated) NOTE — LETTER
[facility], [place]  Authorization for Invasive Procedures  1.  I hereby authorize Dr. Ann Marie Connors , my physician and whomever may be designated as the doctor's assistant, to perform the following operation and/or procedure:  Elvis occur: fever and allergic reactions, hemolytic reactions, transmission of disease such as hepatitis, AIDS, cytomegalovirus (CMV), and flluid overload.  In the event that I wish to have autologous transfusions of my own blood, or a directed donor transfusion Signature of Patient:  ________________________________________________ Date: _________Time: _________    Responsible person in case of minor or unconscious: _____________________________Relationship: ____________     Witness Signature: _______________

## (undated) NOTE — LETTER
[address], [place]  Authorization for Invasive Procedures  1.  I hereby authorize Dr. Marietta Ramirez , my physician and whomever may be designated as the doctor's assistant, to perform the following operation and/or procedure:  Pleurx Cath occur: fever and allergic reactions, hemolytic reactions, transmission of disease such as hepatitis, AIDS, cytomegalovirus (CMV), and flluid overload.  In the event that I wish to have autologous transfusions of my own blood, or a directed donor transfusion Signature of Patient:  ________________________________________________ Date: _________Time: _________    Responsible person in case of minor or unconscious: _____________________________Relationship: ____________     Witness Signature: _______________

## (undated) NOTE — LETTER
[address] [place] [address]  Authorization for Invasive Procedures  1.  I hereby authorize Dr. Samantha Frazier , my physician and whomever may be designated as the doctor's assistant, to perform the following operation and/or procedure: that can occur: fever and allergic reactions, hemolytic reactions, transmission of disease such as hepatitis, AIDS, cytomegalovirus (CMV), and flluid overload.  In the event that I wish to have autologous transfusions of my own blood, or a directed donor tr physician.      Signature of Patient:  ________________________________________________ Date: _________Time: _________    Responsible person in case of minor or unconscious: _____________________________Relationship: ____________     Witness Signature: ____

## (undated) NOTE — LETTER
November 12, 2019         Otilia Dawn MD  [facility] [address]  [address]      Patient: Jailyn Ashley   YOB: 1950   Date of Visit: 11/12/2019       Dear Dr. Tricia Cardoso MD,    I saw your patient, Jailyn Ashley, on 11/12/2

## (undated) NOTE — LETTER
3/26/2021              Adri Weems        [address] [facility] [address]         Dear Mirian Braun records indicate that the tests ordered for you by John Nix MD  have not been done.   If you have, in fact, already completed

## (undated) NOTE — IP AVS SNAPSHOT
Patient Demographics     Address  [place] Phone  [phone] (Home) *Preferred*  [phone] [facility])      Emergency Contact(s)     Name Relation Home Work Mobile    Tona Samuel Spouse [phone]        Allergies as of MD, MD         divalproex 500 MG Tbec DR tab  Commonly known as: DEPAKOTE  Next dose due: 11/2- Tonight      Take 1 tablet (500 mg total) by mouth 2 (two) times daily.    Shaye Burch MD, MD         haloperidol 1 MG Tabs  Commonly known as: HALDOL      Ta Action Reason Comments    [id number] amLODIPine (NORVASC) tab 2.5 mg 11/02/21 0953 Given      [id number] atorvastatin (LIPITOR) tab 20 mg 11/01/21 2154 Given      [id number] carBAMazepine (TEGRETOL) chewable tab 100 mg 11/01/21 1232 Given      [id number] carBA SARS-CoV-2 by PCR [[id number]]  (Normal) Collected: 10/26/21 0903    Order Status: Completed Lab Status: Final result Updated: 10/26/21 0929    Specimen: Other from Nares      Rapid SARS-CoV-2 by PCR Not Detected         H&P - H&P Note      H&P signed by Sc Hyperlipidemia    • Internal hemorrhoids 11/30/2017   • Pulmonary emphysema ([facility]    • S/P colonoscopic polypectomy 11/30/2017   • Seizure disorder ([facility]     2014   • [facility]         PAST SURGICAL HISTORY  Past Surgical History:   Procedure Marie Worrell Refills: 3  Comments: Controlled type 2 diabetes mellitus without complication, without long-term current use of insulin ([facility]) E11.9    Glucose Blood (TRUE METRIX BLOOD GLUCOSE TEST) In Vitro Strip  1 each by In Vitro route daily.   Qty: 100 strip Refills: Location: Right arm)   Pulse 118   Temp 98.8 °F (37.1 °C) (Oral)   Resp 22   Ht 5' 5\" (1.651 m)   Wt 170 lb (77.1 kg)   SpO2 94%   BMI 28.29 kg/m²      GENERAL:  Awake and alert, in no acute distress. HEENT: Normocephalic.   Extraocular muscles were intac Appreciate further recommendation  -Cardiology on consult, recommend diuresis, appreciate further recommendations  -Check echocardiogram  -Follow-up thoracentesis and fluid studies  - Diuresis with Lasix 40mg IV BID  - Strict I&Os, Daily weights, Fluid res (Physician)    Related Notes: Original Note by Vinod Mario MD (Physician) filed at 10/26/2021  8:08 PM         [facility]    Report of Consultation    Date of Admission:  10/26/2021  Date of Consult:  10/26/2021   Reason for Consultat 44        skin   • Cancer Brother         thyroid   • Heart Disorder Neg    • Glaucoma Neg        Social History  Patient Guardians:  Not on file    Other Topics            Concern  Caffeine Concern        Yes    Comment:1 cup of coffee in the morning  Exe 10/27/2021] furosemide (LASIX) injection 40 mg, 40 mg, Intravenous, Daily        Allergies    Lisinopril              Coughing  Seasonal                ITCHING    Comment:Itching on eyes and sneezing    Review of Systems:   Constitutional: fatigue and weig 10/26/21  0907   RBC 5.02   HGB 14.9   HCT 43.1   MCV 85.9   NEPRELIM 11.56*   WBC 13.7*   .0     Recent Labs   Lab 10/20/21  1555 10/26/21  0903 10/26/21  1653   * 144* 133*   BUN 8 9 9   CREATSERUM 0.47* 0.43* 0.55*   GFRAA 127 132 120   GF Discussed with Nursing and Dr. Gabriel Morales     Thank you for allowing me to participate in the care of your patient.     Alysa Ceron MD  10/26/2021      Electronically signed by Neha Sams MD on 10/26/2021  8:11 PM              Discharge Summa further assess. Left lung field is clear.     Dictated by (CST): Michelle Diez MD on 10/22/2021 at 3:19 PM     Finalized by (CST): Michelle Diez MD on 10/22/2021 at 3:20 PM              2D Echocardiogram Results (HF patients only)    No exam resulted thi balance activity with emphasis on core stabilization. Constant cuing for participation provided. Groggy. Patient was left in bed at end of session with all needs in reach.  The patient's Approx Degree of Impairment: 50.57% has been calculated based on shanikan THERAPEUTIC EXERCISES  Lower Extremity Glut sets  Hip AB/AD  Heel slides  Quad sets     Position Supine       Patient End of Session: In bed;Call light within reach;RN aware of session/findings;Bracing education provided; All patient questions and sylvester pt ready to work with therapist. Therapist donned with mask on and pt, pt's family with mask on. Pt sitting on EOB with good sitting posture. Instructed on gentle, seated thera exs with ble's to promot functional mobility. Pt with good activity nikki.  Sit to from lying on back to sitting on the side of the bed?: A Little   How much help from another person does the patient currently need. ..   -   Moving to and from a bed to a chair (including a wheelchair)?: A Little   -   Need to walk in hospital room?: A Lit 10/30/2021 12:11 PM through 11/2/2021 12:11 PM   No notes of this type exist for this encounter. Video Swallow Study Notes    No notes of this type exist for this encounter. SLP Notes    No notes of this type exist for this encounter.      Estill Epley

## (undated) NOTE — LETTER
[facility], [facility], [place]  [address] [facility], [address] [facility], [facility]  Dept: [phone]    9/4/2018        Amie Saunders        [address] [facility] [address]             Dear Hurman Runner,    Our rec

## (undated) NOTE — MR AVS SNAPSHOT
[place]  [address]  [phone]               Thank you for choosing us for your health care visit with Dulce Maria Gutierrez MD.  We are glad to serve you and happy to provide you with this summary Assoc Dx: Benign non-nodular prostatic hyperplasia, presence of lower urinary tract symptoms unspecified [N40.0]                 Follow-up Instructions     Return in about 4 weeks (around 4/21/2017).       Scheduling Instructions     Friday March 24, 2017 recent med list.                aspirin 81 MG Tbec   Take 1 tablet (81 mg total) by mouth daily.            * carBAMazepine 100 MG Chew   CHEW AND SWALLOW 1 TABLET EVERY DAY   Commonly known as:  TEGRETOL           * carBAMazepine 200 MG Tabs   TAKE ONE TAB walking, light jogging, cycling, swimming, etc.) for a goal of at least 150 minutes per week. Moderation of alcohol consumption Men: limit to <= 2 drinks* per day. Women and lighter weight persons: limit to <= 1 drink* per day.               Visit EDWARD

## (undated) NOTE — LETTER
1/4/2019    Amie Saunders        [address] [place] [address] [address] [address]            Dear Amie Saunders,      Our records indicate that you are due for an appointment for a Colonoscopy on or about February 1st 2019 with Christophe Nichols MD.

## (undated) NOTE — ED AVS SNAPSHOT
Leyla Guillermo   MRN: [id number]    Department:  [facility] Emergency Department   Date of Visit:  7/15/2018           Disclosure     Insurance plans vary and the physician(s) referred by the ER may not be covered by your plan.  Please cont within the next three months to obtain basic health screening including reassessment of your blood pressure.     IF THERE IS ANY CHANGE OR WORSENING OF YOUR CONDITION, CALL YOUR PRIMARY CARE PHYSICIAN AT ONCE OR RETURN IMMEDIATELY TO THE EMERGENCY DEPARTMEN

## (undated) NOTE — LETTER
March 6, 2018     Leyla Guillermo  [address]      Dear Brunswick Karsten:    Below are the results from your recent visit: Labs all good including cholesterol stable.  Blood counts liver and kidney function good.  Hepatitis C is nega Lymphocyte Absolute 3.1 1.0 - 4.0 K/UL    Monocyte Absolute 0.8 0.0 - 1.0 K/UL    Eosinophil Absolute 0.4 0.0 - 0.7 K/UL    Basophil Absolute 0.1 0.0 - 0.2 K/UL       If you have any questions or concerns, please don't hesitate to call.  [phone]

## (undated) NOTE — MR AVS SNAPSHOT
[address], [address]  [address] [facility]  [phone]               Thank you for choosing us for your health care visit with Sotero Kidd.  MD Boyd.  We are glad to serve you and happy to provide you with this summary your appointment immediately. However, if you are unsure about the requirements for authorization, please wait 5-7 days and then contact your physician's office.  At that time, you will be provided with any authorization numbers or be assured that none are prediabetic patients        Cardiovascular Disease Screening     Cholesterol, covered every 5 yrs including Total, LDL and Trigs LDL CHOLESTEROL (mg/dL)   Date Value   05/04/2016 96     CHOLESTEROL, TOTAL (mg/dL)   Date Value   05/04/2016 194     TRIGLYCER Covered Annually No orders found for this or any previous visit.  Please get every year    Pneumococcal 13 (Prevnar)  Covered Once after 65   Orders placed or performed in visit on 12/28/15  -PNEUMOCOCCAL VACC, 13 SHALONDA IM    Please get once after your 63th b Allergies as of May 30, 2017     No Known Allergies                Today's Vital Signs     BP Pulse Temp Height Weight BMI    159/68 mmHg 106 98.7 °F (37.1 °C) (Oral) 5' 5\" (1.651 m) 180 lb (81.647 kg) 29.95 kg/m2         Current Medications          This Visit [facility] online at  [email or web address]

## (undated) NOTE — LETTER
[facility], [place]  Authorization for Invasive Procedures  1.  I hereby authorize Dr. Pablito Sloan, my physician and whomever may be designated as the doctor's assistant, to perform the following operation and/or procedure:  Ultrasound occur: fever and allergic reactions, hemolytic reactions, transmission of disease such as hepatitis, AIDS, cytomegalovirus (CMV), and flluid overload.  In the event that I wish to have autologous transfusions of my own blood, or a directed donor transfusion Signature of Patient:  ________________________________________________ Date: _________Time: _________    Responsible person in case of minor or unconscious: _____________________________Relationship: ____________     Witness Signature: _______________

## (undated) NOTE — MR AVS SNAPSHOT
[facility] [place] [facility] [place] [address] [address] [address]  [phone]               Thank you for choosing us for your health care visit with Chidi Baker MD.  We are glad to serve you and happy to provide you with this summary of y * Notice: This list has 2 medication(s) that are the same as other medications prescribed for you. Read the directions carefully, and ask your doctor or other care provider to review them with you.             Today's Orders     CARD ECHO 2D DOPPLER CONTR Support Staff. Remember, [employer] is NOT to be used for urgent needs. For medical emergencies, dial 911. Visit [email or web address] to learn more.         Educational Information     Your blood pressure indicates you may be at-risk for Hypertensi active are less likely to develop some chronic diseases than adults who are inactive.      HOW TO GET STARTED: HOW TO STAY MOTIVATED:   Start activities slowly and build up over time Do what you like   Get your heart pumping – brisk walking, biking, swimmin